# Patient Record
Sex: MALE | Employment: UNEMPLOYED | URBAN - METROPOLITAN AREA
[De-identification: names, ages, dates, MRNs, and addresses within clinical notes are randomized per-mention and may not be internally consistent; named-entity substitution may affect disease eponyms.]

---

## 2017-11-15 ENCOUNTER — OFFICE VISIT (OUTPATIENT)
Dept: BEHAVIORAL/MENTAL HEALTH CLINIC | Age: 62
End: 2017-11-15

## 2017-11-15 VITALS
SYSTOLIC BLOOD PRESSURE: 173 MMHG | DIASTOLIC BLOOD PRESSURE: 86 MMHG | OXYGEN SATURATION: 99 % | HEART RATE: 67 BPM | BODY MASS INDEX: 29.55 KG/M2 | HEIGHT: 73 IN | WEIGHT: 223 LBS

## 2017-11-15 DIAGNOSIS — F17.210 CIGARETTE SMOKER: ICD-10-CM

## 2017-11-15 DIAGNOSIS — F33.1 MODERATE EPISODE OF RECURRENT MAJOR DEPRESSIVE DISORDER (HCC): Primary | ICD-10-CM

## 2017-11-15 DIAGNOSIS — F41.1 GAD (GENERALIZED ANXIETY DISORDER): ICD-10-CM

## 2017-11-15 DIAGNOSIS — F43.10 PTSD (POST-TRAUMATIC STRESS DISORDER): ICD-10-CM

## 2017-11-15 PROBLEM — G89.4 CHRONIC PAIN DISORDER: Status: ACTIVE | Noted: 2017-11-15

## 2017-11-15 PROBLEM — H91.90 HOH (HARD OF HEARING): Status: ACTIVE | Noted: 2017-11-15

## 2017-11-15 PROBLEM — E11.9 DIABETES MELLITUS TYPE 2, CONTROLLED (HCC): Status: ACTIVE | Noted: 2017-11-15

## 2017-11-15 RX ORDER — GABAPENTIN 100 MG/1
100 CAPSULE ORAL 3 TIMES DAILY
Qty: 90 CAP | Refills: 1 | Status: SHIPPED | OUTPATIENT
Start: 2017-11-15 | End: 2018-01-22 | Stop reason: SDUPTHER

## 2017-11-15 RX ORDER — QUETIAPINE FUMARATE 100 MG/1
100 TABLET, FILM COATED ORAL
COMMUNITY
Start: 2017-10-31 | End: 2018-01-22 | Stop reason: SDUPTHER

## 2017-11-15 NOTE — MR AVS SNAPSHOT
Visit Information Date & Time Provider Department Dept. Phone Encounter #  
 11/15/2017  9:00 AM Morgan Shipley MD [facility] [phone] [id number] Follow-up Instructions Return in about 2 months (around 1/15/2018). Upcoming Health Maintenance Date Due Hepatitis C Screening 1955 Pneumococcal 19-64 Medium Risk (1 of 1 - PPSV23) 10/17/1975 FOBT Q 1 YEAR AGE 50-75 10/17/2006 ZOSTER VACCINE AGE 60> 8/17/2016 Influenza Age 5 to Adult 8/1/2017 DTaP/Tdap/Td series (2 - Td) 3/28/2023 Allergies as of 11/15/2017  Review Complete On: 11/15/2017 By: Mauro Polo CNA Severity Noted Reaction Type Reactions Flexeril [Cyclobenzaprine]  09/10/2015    Other (comments) Librium [Chlordiazepoxide Hcl]  09/10/2015    Hives Current Immunizations  Never Reviewed Name Date Tdap 3/28/2013  5:41 AM  
  
 Not reviewed this visit Vitals BP Pulse Height(growth percentile) Weight(growth percentile) SpO2 BMI  
 173/86 (BP 1 Location: Left arm, BP Patient Position: Sitting) 67 6' 1\" (1.854 m) 223 lb (101.2 kg) 99% 29.42 kg/m2 Smoking Status Current Every Day Smoker Vitals History BMI and BSA Data Body Mass Index Body Surface Area  
 29.42 kg/m 2 2.28 m 2 Preferred Pharmacy Pharmacy Name Phone [id number] [place], [id number] [id number] [phone] Your Updated Medication List  
  
   
This list is accurate as of: 11/15/17 10:21 AM.  Always use your most recent med list.  
  
  
  
  
 gabapentin 100 mg capsule Commonly known as:  NEURONTIN Take 1 Cap by mouth three (3) times daily. HYDROcodone-acetaminophen 5-500 mg Cap Take  by mouth. ibuprofen 600 mg tablet Commonly known as:  MOTRIN Take 1 Tab by mouth every six (6) hours as needed for Pain. oxyCODONE-acetaminophen 5-325 mg per tablet Commonly known as:  PERCOCET Take 1 Tab by mouth every four (4) hours as needed for Pain. PROzac 40 mg capsule Generic drug:  FLUoxetine Take 40 mg by mouth daily. QUEtiapine 100 mg tablet Commonly known as:  SEROquel Take 100 mg by mouth nightly. VALIUM 10 mg tablet Generic drug:  diazePAM  
Take 10 mg by mouth every six (6) hours as needed for Anxiety. XANAX PO Take  by mouth. Prescriptions Sent to Pharmacy Refills  
 gabapentin (NEURONTIN) 100 mg capsule 1 Sig: Take 1 Cap by mouth three (3) times daily. Class: Normal  
 Pharmacy: [address] [place] [address] 1  #: [phone] Route: Oral  
  
Follow-up Instructions Return in about 2 months (around 1/15/2018). Introducing [facility] SERVICES! Hanh Xavier introduces [employer] patient portal. Now you can access parts of your medical record, email your doctor's office, and request medication refills online. 1. In your internet browser, go to [email or web address] 2. Click on the First Time User? Click Here link in the Sign In box. You will see the New Member Sign Up page. 3. Enter your [employer] Access Code exactly as it appears below. You will not need to use this code after youve completed the sign-up process. If you do not sign up before the expiration date, you must request a new code. · [employer] Access Code: [email or web address] Expires: 2/13/2018 10:21 AM 
 
4. Enter the last four digits of your Social Security Number (xxxx) and Date of Birth (mm/dd/yyyy) as indicated and click Submit. You will be taken to the next sign-up page. 5. Create a [employer] ID. This will be your [employer] login ID and cannot be changed, so think of one that is secure and easy to remember. 6. Create a [employer] password. You can change your password at any time. 7. Enter your Password Reset Question and Answer.  This can be used at a later time if you forget your password. 8. Enter your e-mail address. You will receive e-mail notification when new information is available in [address] 9. Click Sign Up. You can now view and download portions of your medical record. 10. Click the Download Summary menu link to download a portable copy of your medical information. If you have questions, please visit the Frequently Asked Questions section of the [employer] website. Remember, [employer] is NOT to be used for urgent needs. For medical emergencies, dial 911. Now available from your iPhone and Android! Please provide this summary of care documentation to your next provider. Your primary care clinician is listed as Ursula Kwon. If you have any questions after today's visit, please call [phone].
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
## 2018-01-22 ENCOUNTER — OFFICE VISIT (OUTPATIENT)
Dept: BEHAVIORAL/MENTAL HEALTH CLINIC | Age: 63
End: 2018-01-22

## 2018-01-22 ENCOUNTER — TELEPHONE (OUTPATIENT)
Dept: BEHAVIORAL/MENTAL HEALTH CLINIC | Age: 63
End: 2018-01-22

## 2018-01-22 DIAGNOSIS — F43.10 PTSD (POST-TRAUMATIC STRESS DISORDER): ICD-10-CM

## 2018-01-22 DIAGNOSIS — F17.210 CIGARETTE SMOKER: ICD-10-CM

## 2018-01-22 DIAGNOSIS — F41.1 GAD (GENERALIZED ANXIETY DISORDER): ICD-10-CM

## 2018-01-22 DIAGNOSIS — F33.1 MODERATE EPISODE OF RECURRENT MAJOR DEPRESSIVE DISORDER (HCC): Primary | ICD-10-CM

## 2018-01-22 NOTE — PROGRESS NOTES
Psychiatric Outpatient Progress Note    Account Number:  [de-identified]  Name: Kitty Delacruz    SUBJECTIVE:   CHIEF COMPLAINT:  Kitty Delacruz is a 65 y.o. male and was seen today for her first follow-up of psychiatric condition and psychotropic medication management. He came 2 hours late today. He was late for his initial evaluation as well. HPI:    Valencia Torre reports the following psychiatric symptoms:  depression, anxiety and MCI. The symptoms have been present for years and are of moderate severity. The symptoms occur daily. Initial Assessement & Diagnoses (11/15/17): This is a 69-year-old,  white male, with a long history of PTSD, depression, anxiety and multiple medical problems was seen today to establish his mental health treatment here as his previous psychiatrist retired. Patient is currently depressed and significant anxiety and marginal level of functioning. Patient was initially focused on getting more benzodiazepines but agreed to try gabapentin for his anxiety and chronic pain and continue current dose of Prozac and Seroquel. Patient is getting counseling through [facility].   Primary diagnosis: Major depressive disorder, recurrent, moderate, PTSD, LAVERN, nicotine dependence  Secondary diagnosis: No significant personality issues at this time  Tertiary diagnosis: Severe hard of hearing, diabetes mellitus, status post back and neck injury, chronic pain, obesity  Strength & Weaknesses: Patient is cooperative and pleasant. Motivated to change. Has good primary care follow-up. Today, he reported that he is not doing well and stays extremely anxious. Starting of gabapentin on the initial visit has not been very helpful. He is [employer] and has poor self care. Appears much older than his stated age. He wanted to go back on Xanax though already showing cognitive impairment. He is irritable and argumentative. Reported poor sleep. Eating well. Weight has gone up.  Reports compliance with medications. Denies any psychosis or shira. Contributing factors include: unemployed, lives alone. Patient denies SI/HI/SIB. Side Effects:  none      Fam/Soc Hx (from [place] with updates):       REVIEW OF SYSTEMS:  Constitutional: positive for fatigue and weight gain  Eyes: positive for contacts/glasses and visual disturbance  Ears, nose, mouth, throat, and face: positive for hearing loss  Respiratory: positive for cough  Musculoskeletal:positive for myalgias and arthralgias  Neurological: positive for memory problems and gait problems  Behavioral/Psych: positive for anxiety and depression, negative for SI or HI     VITALS:   1/22/2018   BLOOD PRESSURE - SYSTOLIC 424   BLOOD PRESSURE - DIASTOLIC 75   WEIGHT 602 lb   BODY MASS INDEX 31.8   PULSE 73       OBJECTIVE:                 Mental Status exam: WNL except for      Sensorium  confused, oriented to time, place and person   Relations uncooperative, unreliable and vague    Eye Contact    poor   Appearance:  bearded, casually dressed, disheveled, older than stated age and poor hygiene   Motor Behavior/Gait:  hypoactive and gait unsteady   Speech:  normal pitch and normal volume   Thought Process: circumstantial, illogical and tangential   Thought Content free of delusions and free of hallucinations   Suicidal ideations none   Homicidal ideations none   Mood:  depressed   Affect:  anxious, irritable and labile   Memory recent  impaired   Memory remote:  adequate   Concentration:  adequate   Abstraction:  concrete   Insight:  fair   Reliability poor   Judgment:  poor       MEDICAL DECISION MAKING  Data: pertinent labs, imaging, medical records and diagnostic tests reviewed and incorporated in diagnosis and treatment plan    Allergies   Allergen Reactions    Flexeril [Cyclobenzaprine] Other (comments)    Librium [Chlordiazepoxide Hcl] Hives        Current Outpatient Prescriptions   Medication Sig Dispense Refill    albuterol (VENTOLIN HFA) 90 mcg/actuation inhaler TAKE 1-2 PUFFS EVERY 4 TO 6 HOURS AS NEEDED      diazePAM (VALIUM) 2 mg tablet Take 1 Tab by mouth every eight (8) hours as needed for Anxiety. Max Daily Amount: 6 mg. 90 Tab 1    gabapentin (NEURONTIN) 300 mg capsule Take 1 Cap by mouth three (3) times daily. 90 Cap 1    QUEtiapine (SEROQUEL) 100 mg tablet Take 1 Tab by mouth nightly. 30 Tab 1    FLUoxetine (PROZAC) 40 mg capsule Take 1 Cap by mouth daily. 30 Cap 1    ibuprofen (MOTRIN) 600 mg tablet Take 1 Tab by mouth every six (6) hours as needed for Pain. 20 Tab 0          Problems addressed today:  MDD, LAVERN, PTSD, Smoker    Assessment:   Edie Martin  is a 65 y.o.  male  is not responding to treatment. Symptoms are unstable. Patient denies SI/HI/SIB. No evidence of AH/VH or delusions. Risk Scoring- chronic illnesses and prescription drug management    Treatment Plan:  1. Medications:          Medication Changes/Adjustments: start low dose diazepam ( short term), increase gabapentin, continue Prozac, and Seroquel in the current dosages. Current Outpatient Prescriptions   Medication Sig Dispense Refill    albuterol (VENTOLIN HFA) 90 mcg/actuation inhaler TAKE 1-2 PUFFS EVERY 4 TO 6 HOURS AS NEEDED      diazePAM (VALIUM) 2 mg tablet Take 1 Tab by mouth every eight (8) hours as needed for Anxiety. Max Daily Amount: 6 mg. 90 Tab 1    gabapentin (NEURONTIN) 300 mg capsule Take 1 Cap by mouth three (3) times daily. 90 Cap 1    QUEtiapine (SEROQUEL) 100 mg tablet Take 1 Tab by mouth nightly. 30 Tab 1    FLUoxetine (PROZAC) 40 mg capsule Take 1 Cap by mouth daily. 30 Cap 1    ibuprofen (MOTRIN) 600 mg tablet Take 1 Tab by mouth every six (6) hours as needed for Pain.  20 Tab 0                  The following regarding medications was addressed:    (The risks and benefits of the proposed medication; the potential medication side effects ie    dry mouth, weight gain, GI upset, headache; patient given opportunity to ask questions)       2. Counseling and coordination of care including instructions for treatment, risks/benefits, risk factor reduction and patient/family education. He agrees with the plan. Patient instructed to call with any side effects, questions or issues. 3. Pt was educated and counseled on his demands for Xanax and it's complications. He was also educated on his tardiness. PSYCHOTHERAPY:  approx 20 minutes  Type:  Supportive/Solution Focused psychotherapy provided  Focus:     Current problems:              compliance   Housing issues   Occupational issues   Medical issues     Psychoeducation provided: psych medications    Treatment plan reviewed with patient-including diagnosis and medications    Worked on issues of denial & effects of benzo dependency/use    Bob Mcintosh is not progressing.     Follow up : 2 months      Won Duarte MD  1/22/2018

## 2018-02-23 ENCOUNTER — OFFICE VISIT (OUTPATIENT)
Dept: HEMATOLOGY | Age: 63
End: 2018-02-23

## 2018-02-23 VITALS
SYSTOLIC BLOOD PRESSURE: 154 MMHG | HEART RATE: 57 BPM | DIASTOLIC BLOOD PRESSURE: 67 MMHG | TEMPERATURE: 97.8 F | BODY MASS INDEX: 33.74 KG/M2 | OXYGEN SATURATION: 98 % | WEIGHT: 254.6 LBS | HEIGHT: 73 IN

## 2018-02-23 DIAGNOSIS — B18.2 CHRONIC HEPATITIS C WITHOUT HEPATIC COMA (HCC): Primary | ICD-10-CM

## 2018-02-23 NOTE — PROGRESS NOTES
Chief Complaint   Patient presents with   [facility] Patient     HCV     Visit Vitals    /67 (BP 1 Location: Left arm, BP Patient Position: Sitting)    Pulse (!) 57    Temp 97.8 °F (36.6 °C) (Tympanic)    Ht 6' 1\" (1.854 m)    Wt 254 lb 9.6 oz (115.5 kg)    SpO2 98%    BMI 33.59 kg/m2

## 2018-02-23 NOTE — PROGRESS NOTES
70 Lenin Shrestha MD, [address], [address], [place]       April Juan Delarosa, KELSIE Blanca, HENRIQUE Hodge, ACNP-BC   KELSIE Torres NP [facility]    at [facility]    [facility], [address] [place] [address], [facility] 22.    [phone]    FAX: [address] [place] [address], [address]    [phone]    FAX: [phone]       Patient Care Team:  Judah Stephens NP as PCP - General (Nurse Practitioner)  Edgar Moon MD (General Surgery)      Problem List  Date Reviewed: 11/15/2017          Codes Class Noted    Chronic hepatitis C without hepatic coma ([facility] 75.) ICD-10-CM: B18.2  ICD-9-CM: 070.54  2/23/2018        Moderate episode of recurrent major depressive disorder ([facility] 75.) ICD-10-CM: F33.1  ICD-9-CM: 296.32  11/15/2017        PTSD (post-traumatic stress disorder) ICD-10-CM: F43.10  ICD-9-CM: 309.81  11/15/2017        LAVERN (generalized anxiety disorder) ICD-10-CM: F41.1  ICD-9-CM: 300.02  11/15/2017        [place] (hard of hearing) ICD-10-CM: H91.90  ICD-9-CM: 389.9  11/15/2017        Diabetes mellitus type 2, controlled ([facility] 75.) ICD-10-CM: E11.9  ICD-9-CM: 250.00  11/15/2017        Cigarette smoker ICD-10-CM: F17.210  ICD-9-CM: 305.1  11/15/2017        Chronic pain disorder ICD-10-CM: G89.4  ICD-9-CM: 338.4  11/15/2017    Overview Signed 11/15/2017 10:25 AM by Aam Hu MD     Back and neck             Morbid obesity ([facility] 75.) ICD-10-CM: E66.01  ICD-9-CM: 278.01  9/29/2015        Incisional hernia ICD-10-CM: M96.5  ICD-9-CM: 553.21  9/29/2015              The physicians listed above have asked me to see Corazon Mcclellan in consultation regarding chronic HCV and its management.   All medical records sent by the referring physicians were reviewed. The patient is a 65 y.o.  male who was noted to have abnormalities in liver chemistries and subsequently tested positive for chronic HCV remotely. Risk factors for acquiring HCV are blood transfusions. There was no history of acute icteric hepatitis at the time of these risk factors. The most recent imaging of the liver was CT performed in 2015. Results suggest that the liver is normal.      An assessment of liver fibrosis with biopsy or elastography has not been performed. The patient has never received treatment for chronic HCV. The most recent laboratory studies indicate that the liver transaminases are normal, alkaline phosphatase is normal, elevated, tests of hepatic synthetic and metabolic function are normal, total bilirubin is normal and albumin is normal.     The patient has no symptoms which can be attributed to the liver disorder. The patient has not experienced pain in the right side over the liver, yellowing of the eyes or skin, swelling of the abdomen or swelling of the lower extremities. The patient completes all daily activities without any functional limitations. ALLERGIES  Allergies   Allergen Reactions    Flexeril [Cyclobenzaprine] Other (comments)    Librium [Chlordiazepoxide Hcl] Hives       MEDICATIONS  Current Outpatient Prescriptions   Medication Sig    albuterol (VENTOLIN HFA) 90 mcg/actuation inhaler TAKE 1-2 PUFFS EVERY 4 TO 6 HOURS AS NEEDED    diazePAM (VALIUM) 2 mg tablet Take 1 Tab by mouth every eight (8) hours as needed for Anxiety. Max Daily Amount: 6 mg.    gabapentin (NEURONTIN) 300 mg capsule Take 1 Cap by mouth three (3) times daily.  QUEtiapine (SEROQUEL) 100 mg tablet Take 1 Tab by mouth nightly.  FLUoxetine (PROZAC) 40 mg capsule Take 1 Cap by mouth daily.  ibuprofen (MOTRIN) 600 mg tablet Take 1 Tab by mouth every six (6) hours as needed for Pain.      No current facility-administered medications for this visit. SYSTEM REVIEW NOT RELATED TO LIVER DISEASE OR REVIEWED ABOVE:  Constitution systems: Negative for fever, chills, weight gain, weight loss. Eyes: Negative for visual changes. ENT: Negative for sore throat, painful swallowing. Respiratory: Negative for cough, hemoptysis, SOB. Cardiology: Negative for chest pain, palpitations. GI:  Negative for constipation or diarrhea. : Negative for urinary frequency, dysuria, hematuria, nocturia. Skin: Negative for rash. Hematology: Negative for easy bruising, blood clots. Musculo-skeletal: Negative for back pain, muscle pain, weakness. Neurologic: Negative for headaches, dizziness, vertigo, memory problems not related to HE. Psychology: Positive for anxiety, depression and PTSD. FAMILY HISTORY:  The father  of AMI. The mother  of cancer. There is no family history of liver disease. SOCIAL HISTORY:  The patient is single. The patient has no children. The patient currently smokes 1 pack of tobacco daily. The patient has 2 beers 2-3/week. The patient is currently receiving disability. PHYSICAL EXAMINATION:  Visit Vitals    /67 (BP 1 Location: Left arm, BP Patient Position: Sitting)    Pulse (!) 57    Temp 97.8 °F (36.6 °C) (Tympanic)    Ht 6' 1\" (1.854 m)    Wt 254 lb 9.6 oz (115.5 kg)    SpO2 98%    BMI 33.59 kg/m2     General: No acute distress. Obese  Eyes: Sclera anicteric. ENT: No oral lesions. Nodes: No adenopathy. Skin: No spider angiomata. No jaundice. No palmar erythema. Respiratory: Lungs clear to auscultation. Cardiovascular: Regular heart rate. 2/6 mid-systolic murmur. No JVD. Abdomen: Soft non-tender. Liver size normal to percussion/palpation. Spleen not palpable. No obvious ascites. Extremities: No edema. No muscle wasting. No gross arthritic changes. Neurologic: Alert and oriented. Cranial nerves grossly intact. No asterixis.     LABORATORY STUDIES:  From 2/13/18:   AST//ALT/ALP/T Bili/ALB: 32/28/101/0.5/4.4  BUN/CR: 10/0.87    L[facility] [place] of [address] Units 2/23/2018   WBC 3.4 - 10.8 x10E3/uL 4.3   ANC 1.4 - 7.0 x10E3/uL 2.2   HGB 13.0 - 17.7 g/dL 13.0    - 379 x10E3/uL 180   INR 0.8 - 1.2 1.0   AST 0 - 40 IU/L 32   ALT 0 - 44 IU/L 27   Alk Phos 39 - 117 IU/L 73   Bili, Total 0.0 - 1.2 mg/dL 0.4   Bili, Direct 0.00 - 0.40 mg/dL 0.15   Albumin 3.6 - 4.8 g/dL 4.3   BUN 8 - 27 mg/dL 14   Creat 0.76 - 1.27 mg/dL 1.04   Na 134 - 144 mmol/L 140   K 3.5 - 5.2 mmol/L 5.6 (H)   Cl 96 - 106 mmol/L 101   CO2 18 - 29 mmol/L 25   Glucose 65 - 99 mg/dL 75     SEROLOGIES:  Not available or performed. Testing was performed today. LIVER HISTOLOGY:  Not available or performed    ENDOSCOPIC PROCEDURES:  Not available or performed    RADIOLOGY:  10/2015. CT scan abdomen with IV contrast.  Normal appearing liver. No liver mass lesions. Normal spleen. No ascites. OTHER TESTING:  Not available or performed    ASSESSMENT AND PLAN:  Given normal liver enzymes the patient may have had spontaneous resolution of HCV. If HCV RNA is negative the patient will return for a second test to confirm spontaneous resolution and ensure the first HCV RNA test was not a false negative. Once there are 2 negative HCV RNA tests no further testing for HCV is required. If HCV RNA is positive additional evaluation for HCV will be needed prior to treatment. Liver function is normal.  Total bilirubin is normal.  Serum albumin is normal.  The platelet count is normal.      Based upon laboratory studies,  the patient does not appear to have advanced liver disease or cirrhosis. Will perform laboratory testing to monitor liver function and degree of liver injury. This included BMP, hepatic panel, CBC with platelet count and INR.       Will perform and/or review results of HCV viral load and HCV genotype to define the specific treatment and duration of treatment that will be required. Will perform serologic and virologic studies to assess for other causes of chronic liver disease. Will perform imaging of the liver with ultrasound. The need to perform an assessment of liver fibrosis was discussed with the patient. The FibroScan can assess liver fibrosis and determine if a patient has advanced fibrosis or cirrhosis without the need for liver biopsy. The FibroScan is currently available at [facility]. This will be performed at the next office visit. The patient has not previously been treated for HCV. Discussed the treatment alternatives. The SVR/cure rate for HCV now exceeds 90% with just oral anti-viral therapy and no interferon injections or significant side effects for most patients with HCV. The specific treatment is dependent upon genotype, viral load and histology. The patient was directed to continue all current medications at the current dosages. There are no contraindications for the patient to take any medications that are necessary for treatment of other medical issues. The patient was counseled regarding alcohol consumption. The need for vaccination against viral hepatitis A and B will be assessed with serologic and instituted as appropriate. [facility]. screening will be implemented if the patient is cirrhotic. All of the above issues were discussed with the patient. All questions were answered. The patient expressed a clear understanding of the above. [facility] 87 in 4 weeks for FibroScan, to review all data and determine the treatment plan.     Ministerio Khoury MD  L[facility] [place] of [address] [place] [address] Drive 6[address] [facility], [address] [address]  Adis Cunha  22.  [phone]

## 2018-02-23 NOTE — MR AVS SNAPSHOT
2700 [facility] [phone] [address] [place] [address] 
[phone] Patient: Juan C Boston MRN: [id number] :1955 Visit Information Date & Time Provider Department Dept. Phone Encounter #  
 2018 11:05 AM Oscar Arevalo [address] [place] [address] [id number] Your Appointments 3/21/2018  2:00 PM  
ESTABLISHED PATIENT with Elise Polk MD  
[facility]) Appt Note: 3 month follow-up [address] [facility] [address] Replaced by [facility] [address]  
  
   
 [facility] 316 [facility] [address] [facility] 7 [address] Upcoming Health Maintenance Date Due Hepatitis C Screening 1955 HEMOGLOBIN A1C Q6M 1955 LIPID PANEL Q1 1955 FOOT EXAM Q1 10/17/1966 MICROALBUMIN Q1 10/17/1966 EYE EXAM RETINAL OR DILATED Q1 10/17/1966 Pneumococcal 19-64 Medium Risk (1 of 1 - PPSV23) 10/17/1975 FOBT Q 1 YEAR AGE 50-75 10/17/2006 ZOSTER VACCINE AGE 60> 2016 Influenza Age 5 to Adult 2017 DTaP/Tdap/Td series (2 - Td) 3/28/2023 Allergies as of 2018  Review Complete On: 2018 By: Barrington Carter LPN Severity Noted Reaction Type Reactions Flexeril [Cyclobenzaprine]  09/10/2015    Other (comments) Librium [Chlordiazepoxide Hcl]  09/10/2015    Hives Current Immunizations  Never Reviewed Name Date Tdap 3/28/2013  5:41 AM  
  
 Not reviewed this visit You Were Diagnosed With   
  
 Codes Comments Chronic hepatitis C without hepatic coma (HCC)    -  Primary ICD-10-CM: B18.2 ICD-9-CM: 070.54 Vitals BP Pulse Temp Height(growth percentile) 154/67 (BP 1 Location: Left arm, BP Patient Position: Sitting) (!) 57 97.8 °F (36.6 °C) (Tympanic) 6' 1\" (1.854 m) Weight(growth percentile) SpO2 BMI Smoking Status 254 lb 9.6 oz (115.5 kg) 98% 33.59 kg/m2 Current Every Day Smoker BMI and BSA Data Body Mass Index Body Surface Area  
 33.59 kg/m 2 2.44 m 2 Preferred Pharmacy Pharmacy Name Phone [id number], [address] [facility] 3 [phone] Your Updated Medication List  
  
   
This list is accurate as of 2/23/18 11:48 AM.  Always use your most recent med list.  
  
  
  
  
 diazePAM 2 mg tablet Commonly known as:  VALIUM Take 1 Tab by mouth every eight (8) hours as needed for Anxiety. Max Daily Amount: 6 mg. FLUoxetine 40 mg capsule Commonly known as:  PROzac Take 1 Cap by mouth daily. gabapentin 300 mg capsule Commonly known as:  NEURONTIN Take 1 Cap by mouth three (3) times daily. ibuprofen 600 mg tablet Commonly known as:  MOTRIN Take 1 Tab by mouth every six (6) hours as needed for Pain. QUEtiapine 100 mg tablet Commonly known as:  SEROquel Take 1 Tab by mouth nightly. VENTOLIN HFA 90 mcg/actuation inhaler Generic drug:  albuterol TAKE 1-2 PUFFS EVERY 4 TO 6 HOURS AS NEEDED We Performed the Following CBC WITH AUTOMATED DIFF [94433 CPT(R)] HCV RNA BY ROSA QL,RFLX TO QT [31102 CPT(R)] HEP A AB, TOTAL [id number] CPT(R)] HEP B SURFACE AB [id number] CPT(R)] HEP B SURFACE AG [id number] CPT(R)] HEP C GENOTYPE [03052 CPT(R)] HEPATIC FUNCTION PANEL [05930 CPT(R)] HEPATITIS B CORE AB, TOTAL [id number] CPT(R)] METABOLIC PANEL, BASIC [61430 CPT(R)] PROTHROMBIN TIME + INR [91921 CPT(R)] To-Do List   
 04/02/2018 1:30 PM  
  Appointment with Isadora Kaplan. Med Sinha NP at [place] [address] ([phone]) Patient Instructions FIBROSCAN PATIENT INFORMATION What is Fibroscan:? 
 
Fibroscan is an ultrasound device that measures liver stiffness by sending a pulse of vibrations through the liver.   This translated into an immediate result that can help your healthcare team determine the level of damage to the liver as well as monitor the condition of various liver diseases over time. Fibroscan is helpful in the evaluation of the following conditions: 
 
Chronic Hepatitis C Chronic Hepatitis B Fatty Liver Disease Alcohol Liver Disease Chronic Cholestatic Liver Diseases What happens During the Scan? Patients receiving this exam lie flat on an examination table and raise the right arm above the head. The skin over the right lower rib cage is exposed and the examiner locates the correct area to be scanned. The prove of the scanner is placed directly on the patient and triggered to start. This fells like a gentle flick against the skin and should not be uncomfortable. At least ten (10) readings are taken and the average is calculated to score the amount of liver stiffness or scar tissue. The exam should take 10-20 minutes. What do I need to do to prepare for the scan? Please do not eat or drink anything 2-4 hours  Before your Fibroscan. You should continue taking any prescribed medication and can take small sips of water or clear fluid to do so,  But avoid drinking large amounts of fluid. Please dress comfortably in clothes that will allow for easy access to the right side of the abdomen. Women are discouraged from wearing a dress on the day of the exam. 
 
Are there any special precautions? Patients who are pregnant or have an implantable device (for example, pacemaker or defibrillator) should not have this exam performed. Patients with a significant amount of fat tissue in the area the probe is pressed may be unable to have test performed. Introducing \[facility] [place] [facility]\"" & HEALTH SERVICES! [employer] introduces [employer] patient portal. Now you can access parts of your medical record, email your doctor's office, and request medication refills online. 1. In your internet browser, go to [email or web address] 2. Click on the First Time User? Click Here link in the Sign In box. You will see the New Member Sign Up page. 3. Enter your [employer] Access Code exactly as it appears below. You will not need to use this code after youve completed the sign-up process. If you do not sign up before the expiration date, you must request a new code. · [employer] Access Code: [email or web address] Expires: 5/24/2018 11:23 AM 
 
4. Enter the last four digits of your Social Security Number (xxxx) and Date of Birth (mm/dd/yyyy) as indicated and click Submit. You will be taken to the next sign-up page. 5. Create a [employer] ID. This will be your [employer] login ID and cannot be changed, so think of one that is secure and easy to remember. 6. Create a [employer] password. You can change your password at any time. 7. Enter your Password Reset Question and Answer. This can be used at a later time if you forget your password. 8. Enter your e-mail address. You will receive e-mail notification when new information is available in [address] 9. Click Sign Up. You can now view and download portions of your medical record. 10. Click the Download Summary menu link to download a portable copy of your medical information. If you have questions, please visit the Frequently Asked Questions section of the [employer] website. Remember, [employer] is NOT to be used for urgent needs. For medical emergencies, dial 911. Now available from your iPhone and Android! Please provide this summary of care documentation to your next provider. Your primary care clinician is listed as vEie Hollis. If you have any questions after today's visit, please call [phone].

## 2018-02-24 LAB
ALBUMIN SERPL-MCNC: 4.3 G/DL (ref 3.6–4.8)
ALP SERPL-CCNC: 73 IU/L (ref 39–117)
ALT SERPL-CCNC: 27 IU/L (ref 0–44)
AST SERPL-CCNC: 32 IU/L (ref 0–40)
BASOPHILS # BLD AUTO: 0 X10E3/UL (ref 0–0.2)
BASOPHILS NFR BLD AUTO: 1 %
BILIRUB DIRECT SERPL-MCNC: 0.15 MG/DL (ref 0–0.4)
BILIRUB SERPL-MCNC: 0.4 MG/DL (ref 0–1.2)
BUN SERPL-MCNC: 14 MG/DL (ref 8–27)
BUN/CREAT SERPL: 13 (ref 10–24)
CALCIUM SERPL-MCNC: 9.6 MG/DL (ref 8.6–10.2)
CHLORIDE SERPL-SCNC: 101 MMOL/L (ref 96–106)
CO2 SERPL-SCNC: 25 MMOL/L (ref 18–29)
CREAT SERPL-MCNC: 1.04 MG/DL (ref 0.76–1.27)
EOSINOPHIL # BLD AUTO: 0.3 X10E3/UL (ref 0–0.4)
EOSINOPHIL NFR BLD AUTO: 6 %
ERYTHROCYTE [DISTWIDTH] IN BLOOD BY AUTOMATED COUNT: 14 % (ref 12.3–15.4)
GFR SERPLBLD CREATININE-BSD FMLA CKD-EPI: 77 ML/MIN/1.73
GFR SERPLBLD CREATININE-BSD FMLA CKD-EPI: 89 ML/MIN/1.73
GLUCOSE SERPL-MCNC: 75 MG/DL (ref 65–99)
HAV AB SER QL IA: POSITIVE
HBV CORE AB SERPL QL IA: POSITIVE
HBV SURFACE AB SER QL: REACTIVE
HBV SURFACE AG SERPL QL IA: NEGATIVE
HCT VFR BLD AUTO: 39.8 % (ref 37.5–51)
HGB BLD-MCNC: 13 G/DL (ref 13–17.7)
IMM GRANULOCYTES # BLD: 0 X10E3/UL (ref 0–0.1)
IMM GRANULOCYTES NFR BLD: 0 %
INR PPP: 1 (ref 0.8–1.2)
LYMPHOCYTES # BLD AUTO: 1.1 X10E3/UL (ref 0.7–3.1)
LYMPHOCYTES NFR BLD AUTO: 25 %
MCH RBC QN AUTO: 30 PG (ref 26.6–33)
MCHC RBC AUTO-ENTMCNC: 32.7 G/DL (ref 31.5–35.7)
MCV RBC AUTO: 92 FL (ref 79–97)
MONOCYTES # BLD AUTO: 0.7 X10E3/UL (ref 0.1–0.9)
MONOCYTES NFR BLD AUTO: 16 %
NEUTROPHILS # BLD AUTO: 2.2 X10E3/UL (ref 1.4–7)
NEUTROPHILS NFR BLD AUTO: 52 %
PLATELET # BLD AUTO: 180 X10E3/UL (ref 150–379)
POTASSIUM SERPL-SCNC: 5.6 MMOL/L (ref 3.5–5.2)
PROT SERPL-MCNC: 7.3 G/DL (ref 6–8.5)
PROTHROMBIN TIME: 10.6 SEC (ref 9.1–12)
RBC # BLD AUTO: 4.34 X10E6/UL (ref 4.14–5.8)
SODIUM SERPL-SCNC: 140 MMOL/L (ref 134–144)
WBC # BLD AUTO: 4.3 X10E3/UL (ref 3.4–10.8)

## 2018-02-26 LAB
HCV RNA SERPL NAA+PROBE-ACNC: NORMAL IU/ML
HCV RNA SERPL NAA+PROBE-ACNC: NORMAL IU/ML
HCV RNA SERPL NAA+PROBE-LOG IU: 7.46 {LOG_IU}/ML
HCV RNA SERPL QL NAA+PROBE: POSITIVE
TEST INFORMATION: NORMAL

## 2018-02-27 LAB
HCV GENTYP SERPL NAA+PROBE: NORMAL
PLEASE NOTE, 550474: NORMAL

## 2018-03-21 ENCOUNTER — OFFICE VISIT (OUTPATIENT)
Dept: BEHAVIORAL/MENTAL HEALTH CLINIC | Age: 63
End: 2018-03-21

## 2018-03-21 VITALS
HEART RATE: 75 BPM | BODY MASS INDEX: 33.13 KG/M2 | WEIGHT: 250 LBS | DIASTOLIC BLOOD PRESSURE: 85 MMHG | SYSTOLIC BLOOD PRESSURE: 178 MMHG | HEIGHT: 73 IN

## 2018-03-21 DIAGNOSIS — F43.10 PTSD (POST-TRAUMATIC STRESS DISORDER): ICD-10-CM

## 2018-03-21 DIAGNOSIS — F33.1 MODERATE EPISODE OF RECURRENT MAJOR DEPRESSIVE DISORDER (HCC): Primary | ICD-10-CM

## 2018-03-21 DIAGNOSIS — F17.210 CIGARETTE SMOKER: ICD-10-CM

## 2018-03-21 DIAGNOSIS — F41.1 GAD (GENERALIZED ANXIETY DISORDER): ICD-10-CM

## 2018-03-21 RX ORDER — TIOTROPIUM BROMIDE INHALATION SPRAY 3.12 UG/1
SPRAY, METERED RESPIRATORY (INHALATION)
Refills: 11 | COMMUNITY
Start: 2018-03-13

## 2018-03-21 RX ORDER — DIAZEPAM 2 MG/1
2 TABLET ORAL
Qty: 90 TAB | Refills: 2 | Status: SHIPPED | OUTPATIENT
Start: 2018-03-21 | End: 2018-06-13 | Stop reason: SDUPTHER

## 2018-03-21 RX ORDER — FLUOXETINE HYDROCHLORIDE 40 MG/1
40 CAPSULE ORAL DAILY
Qty: 30 CAP | Refills: 2 | Status: SHIPPED | OUTPATIENT
Start: 2018-03-21 | End: 2018-06-13 | Stop reason: SDUPTHER

## 2018-03-21 RX ORDER — KETOROLAC TROMETHAMINE 10 MG/1
TABLET, FILM COATED ORAL
Refills: 3 | COMMUNITY
Start: 2018-03-13 | End: 2018-10-01

## 2018-03-21 RX ORDER — MISOPROSTOL 200 UG/1
TABLET ORAL
Refills: 5 | COMMUNITY
Start: 2018-03-13 | End: 2019-01-02

## 2018-03-21 RX ORDER — QUETIAPINE FUMARATE 100 MG/1
100 TABLET, FILM COATED ORAL
Qty: 30 TAB | Refills: 2 | Status: SHIPPED | OUTPATIENT
Start: 2018-03-21 | End: 2018-06-13 | Stop reason: SDUPTHER

## 2018-03-21 RX ORDER — GABAPENTIN 300 MG/1
300 CAPSULE ORAL 3 TIMES DAILY
Qty: 90 CAP | Refills: 2 | Status: SHIPPED | OUTPATIENT
Start: 2018-03-21 | End: 2018-06-13 | Stop reason: SDUPTHER

## 2018-03-21 NOTE — PROGRESS NOTES
Psychiatric Outpatient Progress Note    Account Number:  [de-identified]  Name: Corazon Mcclellan    SUBJECTIVE:   CHIEF COMPLAINT:  Corazon Mcclellan is a 65 y.o. , White male and was seen today for her first follow-up of psychiatric condition and psychotropic medication management.      HPI:    Derral Scale reports the following psychiatric symptoms:  depression, anxiety and MCI. The symptoms have been present for years and are of moderate severity. The symptoms occur daily. .     Patient  reported that he is  doing better with starting of diazepam in the last visit. He still has anxiety and was requesting higher dose of diazepam. He reported compliance with all his medications. He is [place] and has poor self care. Appears much older than his stated age. He was less irritable and more agreeable. Eating well. Weight has gone up. Reports compliance with medications. Denies any psychosis or shira. He has gained 9 lbs. BMI = 33. Not very active physically due to his painful back and knees. He is mildly SOB at baseline. Continues to smoke 1 ppd despite severe lung disease. BP is high. Reported compliance with his non psych medications.       Contributing factors include: unemployed, lives alone.     Patient denies SI/HI/SIB.      Side Effects:  none       Fam/Soc Hx (from [place] with updates):       REVIEW OF SYSTEMS:  Constitutional: positive for fatigue and weight gain  Eyes: positive for contacts/glasses and visual disturbance  Ears, nose, mouth, throat, and face: positive for hearing loss  Respiratory: positive for SOB  Musculoskeletal:positive for myalgias and arthralgias  Neurological: positive for memory problems and gait problems  Behavioral/Psych: positive for anxiety and depression, negative for SI or HI    Visit Vitals    /85    Pulse 75    Ht 6' 1\" (1.854 m)    Wt 113.4 kg (250 lb)    BMI 32.98 kg/m2       OBJECTIVE:                 Mental Status exam: WNL except for      Sensorium  oriented to time, place and person   Relations cooperative and passive    Eye Contact    appropriate   Appearance:  age appropriate, bearded, casually dressed and poor hygiene   Motor Behavior/Gait:  hypoactive   Speech:  hyperverbal, normal pitch and normal volume   Thought Process: circumstantial and tangential   Thought Content free of delusions and free of hallucinations   Suicidal ideations none   Homicidal ideations none   Mood:  euthymic   Affect:  anxious   Memory recent  adequate   Memory remote:  adequate   Concentration:  adequate   Abstraction:  concrete   Insight:  fair   Reliability fair   Judgment:  limited       MEDICAL DECISION MAKING  Data: pertinent labs, imaging, medical records and diagnostic tests reviewed and incorporated in diagnosis and treatment plan    Allergies   Allergen Reactions    Flexeril [Cyclobenzaprine] Other (comments)    Librium [Chlordiazepoxide Hcl] Hives        Current Outpatient Prescriptions   Medication Sig Dispense Refill    SPIRIVA RESPIMAT 2.5 mcg/actuation inhaler INHALE 2 PUFFS BY MOUTH EVERY MORNING  11    miSOPROStol (CYTOTEC) 200 mcg tablet TAKE 1 TABLET BY MOUTH WITH NAPROXEN  5    ketorolac (TORADOL) 10 mg tablet TAKE 1 TABLET BY MOUTH TWICE A DAY AS NEEDED  3    diazePAM (VALIUM) 2 mg tablet Take 1 Tab by mouth every eight (8) hours as needed for Anxiety. Max Daily Amount: 6 mg. 90 Tab 2    gabapentin (NEURONTIN) 300 mg capsule Take 1 Cap by mouth three (3) times daily. 90 Cap 2    QUEtiapine (SEROQUEL) 100 mg tablet Take 1 Tab by mouth nightly. 30 Tab 2    FLUoxetine (PROZAC) 40 mg capsule Take 1 Cap by mouth daily. 30 Cap 2    albuterol (VENTOLIN HFA) 90 mcg/actuation inhaler TAKE 1-2 PUFFS EVERY 4 TO 6 HOURS AS NEEDED      ibuprofen (MOTRIN) 600 mg tablet Take 1 Tab by mouth every six (6) hours as needed for Pain. 20 Tab 0          Problems addressed today:    ICD-10-CM ICD-9-CM    1. Moderate episode of recurrent major depressive disorder (HCC) F33.1 296.32    2. PTSD (post-traumatic stress disorder) F43.10 309.81    3. LAVERN (generalized anxiety disorder) F41.1 300.02 diazePAM (VALIUM) 2 mg tablet   4. Cigarette smoker F17.210 305.1        Assessment:   Augustine Anderson  is a 65 y.o.  male  Is partially responding to treatment. Symptoms are improving. Patient denies SI/HI/SIB. No evidence of AH/VH or delusions. Risk Scoring- chronic illnesses and prescription drug management    Treatment Plan:  1. Medications:          Medication Changes/Adjustments: 1. Continue current combination of Prozac, gabapentin, Seroquel and diazepam.                                                                 2.  Patient's request of increasing the dose of diazepam was denied based on his current MCI and                                                                        Potential of medication dependence. .    Current Outpatient Prescriptions   Medication Sig Dispense Refill    SPIRIVA RESPIMAT 2.5 mcg/actuation inhaler INHALE 2 PUFFS BY MOUTH EVERY MORNING  11    miSOPROStol (CYTOTEC) 200 mcg tablet TAKE 1 TABLET BY MOUTH WITH NAPROXEN  5    ketorolac (TORADOL) 10 mg tablet TAKE 1 TABLET BY MOUTH TWICE A DAY AS NEEDED  3    diazePAM (VALIUM) 2 mg tablet Take 1 Tab by mouth every eight (8) hours as needed for Anxiety. Max Daily Amount: 6 mg. 90 Tab 2    gabapentin (NEURONTIN) 300 mg capsule Take 1 Cap by mouth three (3) times daily. 90 Cap 2    QUEtiapine (SEROQUEL) 100 mg tablet Take 1 Tab by mouth nightly. 30 Tab 2    FLUoxetine (PROZAC) 40 mg capsule Take 1 Cap by mouth daily. 30 Cap 2    albuterol (VENTOLIN HFA) 90 mcg/actuation inhaler TAKE 1-2 PUFFS EVERY 4 TO 6 HOURS AS NEEDED      ibuprofen (MOTRIN) 600 mg tablet Take 1 Tab by mouth every six (6) hours as needed for Pain.  20 Tab 0                  The following regarding medications was addressed:    (The risks and benefits of the proposed medication; the potential medication side effects ie    dry mouth, weight gain, GI upset, headache; patient given opportunity to ask questions)       2. Counseling and coordination of care including instructions for treatment, risks/benefits, risk factor reduction and patient/family education. He agrees with the plan. Patient instructed to call with any side effects, questions or issues. 3.  Patient was provided supportive counseling on his distress of dealing with chronic medical illnesses, chronic pain and significant anxiety. Patient was educated on smoking cessation in the light of his worsening lung conditions. Patient does not appear to be open to the idea      of smoking cessation at this time. Patient was also educated on addictive potential of benzodiazepine. PSYCHOTHERAPY:  approx 20 minutes  Type:  Supportive/Solution Focused psychotherapy provided  Focus:     Current problems:              Obesity              Chronic pain   Medical issues    Psychoeducation provided: psych medications. Treatment plan reviewed with patient-including diagnosis and medications    Worked on issues of denial & effects of nicotine dependency/use    Clari Dominguez is slowly progressing. Follow-up Disposition:  Return in about 3 months (around 6/21/2018).       Timothy Samaniego MD  3/21/2018

## 2018-03-21 NOTE — MR AVS SNAPSHOT
[address] [facility] 
 
 
 [address] [facility] [address] [place] [address] 
[phone] Patient: Elena Wolf MRN: [id number] :1955 Visit Information Date & Time Provider Department Dept. Phone Encounter #  
 3/21/2018  2:00 PM Dennys Beatty MD [facility] [phone] [id number] Follow-up Instructions Return in about 3 months (around 2018). Your Appointments 2018  2:00 PM  
ESTABLISHED PATIENT with Dennys Beatty MD  
[facility] Appt Note: 3 month follow-up 83[address] [facility] [address] [facility] Valery Medina 178  
  
   
 [address] 101 [facility] [address] Upcoming Health Maintenance Date Due HEMOGLOBIN A1C Q6M 1955 LIPID PANEL Q1 1955 FOOT EXAM Q1 10/17/1966 MICROALBUMIN Q1 10/17/1966 EYE EXAM RETINAL OR DILATED Q1 10/17/1966 Pneumococcal 19-64 Medium Risk (1 of 1 - PPSV23) 10/17/1975 FOBT Q 1 YEAR AGE 50-75 10/17/2006 ZOSTER VACCINE AGE 60> 2016 Influenza Age 5 to Adult 2017 DTaP/Tdap/Td series (2 - Td) 3/28/2023 Allergies as of 3/21/2018  Review Complete On: 3/21/2018 By: Dennys Beatty MD  
  
 Severity Noted Reaction Type Reactions Flexeril [Cyclobenzaprine]  09/10/2015    Other (comments) Librium [Chlordiazepoxide Hcl]  09/10/2015    Hives Current Immunizations  Never Reviewed Name Date Tdap 3/28/2013  5:41 AM  
  
 Not reviewed this visit You Were Diagnosed With   
  
 Codes Comments Moderate episode of recurrent major depressive disorder ([facility] [address]    -  Primary ICD-10-CM: F33.1 ICD-9-CM: 296.32   
 PTSD (post-traumatic stress disorder)     ICD-10-CM: F43.10 ICD-9-CM: 309.81 LAVERN (generalized anxiety disorder)     ICD-10-CM: F41.1 ICD-9-CM: 300.02 Cigarette smoker     ICD-10-CM: F17.210 ICD-9-CM: 305.1 Vitals BP Pulse Height(growth percentile) Weight(growth percentile) BMI Smoking Status 178/85 75 6' 1\" (1.854 m) 250 lb (113.4 kg) 32.98 kg/m2 Current Every Day Smoker Vitals History BMI and BSA Data Body Mass Index Body Surface Area 32.98 kg/m 2 2.42 m 2 Preferred Pharmacy Pharmacy Name Phone [phone], [address] [facility] [phone] Your Updated Medication List  
  
   
This list is accurate as of 3/21/18  2:00 PM.  Always use your most recent med list.  
  
  
  
  
 diazePAM 2 mg tablet Commonly known as:  VALIUM Take 1 Tab by mouth every eight (8) hours as needed for Anxiety. Max Daily Amount: 6 mg. FLUoxetine 40 mg capsule Commonly known as:  PROzac Take 1 Cap by mouth daily. gabapentin 300 mg capsule Commonly known as:  NEURONTIN Take 1 Cap by mouth three (3) times daily. ibuprofen 600 mg tablet Commonly known as:  MOTRIN Take 1 Tab by mouth every six (6) hours as needed for Pain.  
  
 ketorolac 10 mg tablet Commonly known as:  TORADOL TAKE 1 TABLET BY MOUTH TWICE A DAY AS NEEDED  
  
 miSOPROStol 200 mcg tablet Commonly known as:  CYTOTEC  
TAKE 1 TABLET BY MOUTH WITH NAPROXEN QUEtiapine 100 mg tablet Commonly known as:  SEROquel Take 1 Tab by mouth nightly. SPIRIVA RESPIMAT 2.5 mcg/actuation inhaler Generic drug:  tiotropium bromide INHALE 2 PUFFS BY MOUTH EVERY MORNING  
  
 VENTOLIN HFA 90 mcg/actuation inhaler Generic drug:  albuterol TAKE 1-2 PUFFS EVERY 4 TO 6 HOURS AS NEEDED Prescriptions Printed Refills  
 diazePAM (VALIUM) 2 mg tablet 2 Sig: Take 1 Tab by mouth every eight (8) hours as needed for Anxiety. Max Daily Amount: 6 mg. Class: Print Route: Oral  
  
Prescriptions Sent to Pharmacy Refills  
 gabapentin (NEURONTIN) 300 mg capsule 2 Sig: Take 1 Cap by mouth three (3) times daily.   
 Class: Normal  
 Pharmacy: [address] [place] [address] Ph #: [phone] Route: Oral  
 QUEtiapine (SEROQUEL) 100 mg tablet 2 Sig: Take 1 Tab by mouth nightly. Class: Normal  
 Pharmacy: [address] [place] [address] Ph #: [phone] Route: Oral  
 FLUoxetine (PROZAC) 40 mg capsule 2 Sig: Take 1 Cap by mouth daily. Class: Normal  
 Pharmacy: [address] [place] [address] Ph #: [phone] Route: Oral  
  
Follow-up Instructions Return in about 3 months (around 6/21/2018). To-Do List   
 04/02/2018 1:30 PM  
  Appointment with Jennie aCde. Sarah Rose NP at [place] [address] ([phone]) Introducing [facility] SERVICES! [facility] introduces [employer] patient portal. Now you can access parts of your medical record, email your doctor's office, and request medication refills online. 1. In your internet browser, go to [email or web address] 2. Click on the First Time User? Click Here link in the Sign In box. You will see the New Member Sign Up page. 3. Enter your [employer] Access Code exactly as it appears below. You will not need to use this code after youve completed the sign-up process. If you do not sign up before the expiration date, you must request a new code. · [employer] Access Code: [email or web address] Expires: 5/24/2018 12:23 PM 
 
4. Enter the last four digits of your Social Security Number (xxxx) and Date of Birth (mm/dd/yyyy) as indicated and click Submit. You will be taken to the next sign-up page. 5. Create a ShowMe VI[employer] ID. This will be your [employer] login ID and cannot be changed, so think of one that is secure and easy to remember. 6. Create a [employer] password. You can change your password at any time. 7. Enter your Password Reset Question and Answer.  This can be used at a later time if you forget your password. 8. Enter your e-mail address. You will receive e-mail notification when new information is available in [address] 9. Click Sign Up. You can now view and download portions of your medical record. 10. Click the Download Summary menu link to download a portable copy of your medical information. If you have questions, please visit the Frequently Asked Questions section of the [employer] website. Remember, [employer] is NOT to be used for urgent needs. For medical emergencies, dial 911. Now available from your iPhone and Android! Please provide this summary of care documentation to your next provider. Your primary care clinician is listed as Victoriano Ramírez. If you have any questions after today's visit, please call [phone].

## 2018-04-13 ENCOUNTER — OFFICE VISIT (OUTPATIENT)
Dept: HEMATOLOGY | Age: 63
End: 2018-04-13

## 2018-04-13 VITALS
TEMPERATURE: 97 F | WEIGHT: 254 LBS | HEART RATE: 71 BPM | OXYGEN SATURATION: 98 % | SYSTOLIC BLOOD PRESSURE: 188 MMHG | BODY MASS INDEX: 33.51 KG/M2 | DIASTOLIC BLOOD PRESSURE: 84 MMHG

## 2018-04-13 DIAGNOSIS — B18.2 CHRONIC HEPATITIS C WITHOUT HEPATIC COMA (HCC): Primary | ICD-10-CM

## 2018-04-13 RX ORDER — OXYCODONE AND ACETAMINOPHEN 5; 325 MG/1; MG/1
TABLET ORAL
Refills: 0 | COMMUNITY
Start: 2018-03-30 | End: 2018-10-01

## 2018-04-13 NOTE — PROGRESS NOTES
70 Lenin Shrestha MD, [address], [facility], [place]       Gabriele Brewster, HENRIQUE Roberto, [facility]-BC   Tereza Don, KELSIE [facility] 136    at [address], 81 [facility], [facility] 22.    [phone]    FAX: [address] [place] [address], [address]    [phone]    FAX: [phone]       Patient Care Team:  Kimberlyn Martinez NP as PCP - General (Nurse Practitioner)  Destiney Quinones MD (General Surgery)      Problem List  Date Reviewed: 2/25/2018          Codes Class Noted    Chronic hepatitis C without hepatic coma ([facility] ICD-10-CM: B18.2  ICD-9-CM: 070.54  2/23/2018        Moderate episode of recurrent major depressive disorder ([facility].) ICD-10-CM: F33.1  ICD-9-CM: 296.32  11/15/2017        PTSD (post-traumatic stress disorder) ICD-10-CM: F43.10  ICD-9-CM: 309.81  11/15/2017        LAVERN (generalized anxiety disorder) ICD-10-CM: F41.1  ICD-9-CM: 300.02  11/15/2017        [place] (hard of hearing) ICD-10-CM: H91.90  ICD-9-CM: 389.9  11/15/2017        Diabetes mellitus type 2, controlled ([facility] ICD-10-CM: E11.9  ICD-9-CM: 250.00  11/15/2017        Cigarette smoker ICD-10-CM: F17.210  ICD-9-CM: 305.1  11/15/2017        Chronic pain disorder ICD-10-CM: G89.4  ICD-9-CM: 338.4  11/15/2017    Overview Signed 11/15/2017 10:25 AM by Elen Woods MD     Back and neck             Morbid obesity ([facility] ICD-10-CM: E66.01  ICD-9-CM: 278.01  9/29/2015        Incisional hernia ICD-10-CM: K43.2  ICD-9-CM: 553.21  9/29/2015            Nika Shaw returns to the [address] for management of chronic HCV.  The active problem list, all pertinent past medical history, medications, liver histology, radiologic findings and laboratory findings related to the liver disorder were reviewed with the patient. The patient is a 65 y.o.  male who was noted to have abnormalities in liver chemistries and subsequently tested positive for chronic HCV remotely. Risk factors for acquiring HCV are blood transfusions. There was no history of acute icteric hepatitis at the time of these risk factors. The most recent imaging of the liver was CT performed in 2015. Results suggest that the liver is normal.      An assessment of liver fibrosis with elastography will be performed this visit. The patient has never received treatment for chronic HCV. The most recent laboratory studies indicate the liver transaminases are normal, alkaline phosphatase is normal, elevated, tests of hepatic synthetic and metabolic function are normal, total bilirubin is normal and albumin is normal.     The patient has no symptoms which can be attributed to the liver disorder. The patient has not experienced pain in the right side over the liver, yellowing of the eyes or skin, swelling of the abdomen or swelling of the lower extremities. The patient completes all daily activities without any functional limitations. ALLERGIES  Allergies   Allergen Reactions    Flexeril [Cyclobenzaprine] Other (comments)    Librium [Chlordiazepoxide Hcl] Hives     MEDICATIONS  Current Outpatient Prescriptions   Medication Sig    SPIRIVA RESPIMAT 2.5 mcg/actuation inhaler INHALE 2 PUFFS BY MOUTH EVERY MORNING    miSOPROStol (CYTOTEC) 200 mcg tablet TAKE 1 TABLET BY MOUTH WITH NAPROXEN    ketorolac (TORADOL) 10 mg tablet TAKE 1 TABLET BY MOUTH TWICE A DAY AS NEEDED    diazePAM (VALIUM) 2 mg tablet Take 1 Tab by mouth every eight (8) hours as needed for Anxiety. Max Daily Amount: 6 mg.    gabapentin (NEURONTIN) 300 mg capsule Take 1 Cap by mouth three (3) times daily.     QUEtiapine (SEROQUEL) 100 mg tablet Take 1 Tab by mouth nightly.  FLUoxetine (PROZAC) 40 mg capsule Take 1 Cap by mouth daily.  albuterol (VENTOLIN HFA) 90 mcg/actuation inhaler TAKE 1-2 PUFFS EVERY 4 TO 6 HOURS AS NEEDED    ibuprofen (MOTRIN) 600 mg tablet Take 1 Tab by mouth every six (6) hours as needed for Pain.  oxyCODONE-acetaminophen (PERCOCET) 5-325 mg per tablet TAKE 1-2 TABLETS BY MOUTH EVERY 4-6 HOURS AS NEEDED FOR PAIN     No current facility-administered medications for this visit. SYSTEM REVIEW NOT RELATED TO LIVER DISEASE OR REVIEWED ABOVE:  Constitution systems: Negative for fever, chills, weight gain, weight loss. Eyes: Negative for visual changes. ENT: Negative for sore throat, painful swallowing. Respiratory: Negative for cough, hemoptysis, SOB. Cardiology: Negative for chest pain, palpitations. GI:  Negative for constipation or diarrhea. : Negative for urinary frequency, dysuria, hematuria, nocturia. Skin: Negative for rash. Hematology: Negative for easy bruising, blood clots. Musculo-skeletal: Negative for back pain, muscle pain, weakness. Neurologic: Negative for headaches, dizziness, vertigo, memory problems not related to HE. Psychology: Positive for anxiety, depression and PTSD. FAMILY HISTORY:  The father  of AMI. The mother  of cancer. There is no family history of liver disease. SOCIAL HISTORY:  The patient is single. The patient has no children. The patient currently smokes 1 pack of tobacco daily. The patient has 2 beers 2-3/week. The patient is currently receiving disability. PHYSICAL EXAMINATION:  Visit Vitals    /84 (BP 1 Location: Right arm, BP Patient Position: Sitting)    Pulse 71    Temp 97 °F (36.1 °C) (Tympanic)    Wt 254 lb (115.2 kg)    SpO2 98%    BMI 33.51 kg/m2     General: No acute distress. Obese  Eyes: Sclera anicteric. ENT: No oral lesions. Nodes: No adenopathy. Skin: No spider angiomata. No jaundice.   No palmar erythema. Respiratory: Lungs clear to auscultation. Cardiovascular: Regular heart rate. 2/6 mid-systolic murmur. No JVD. Abdomen: Soft non-tender. Liver size normal to percussion/palpation. Spleen not palpable. No obvious ascites. Extremities: No edema. No muscle wasting. No gross arthritic changes. Neurologic: Alert and oriented. Cranial nerves grossly intact. No asterixis. LABORATORY STUDIES:  From 2/13/18:   AST//ALT/ALP/T Bili/ALB: 32/28/101/0.5/4.4  BUN/CR: 10/0.87    Liver [place] of [address] Units 2/23/2018   WBC 3.4 - 10.8 x10E3/uL 4.3   ANC 1.4 - 7.0 x10E3/uL 2.2   HGB 13.0 - 17.7 g/dL 13.0    - 379 x10E3/uL 180   INR 0.8 - 1.2 1.0   AST 0 - 40 IU/L 32   ALT 0 - 44 IU/L 27   Alk Phos 39 - 117 IU/L 73   Bili, Total 0.0 - 1.2 mg/dL 0.4   Bili, Direct 0.00 - 0.40 mg/dL 0.15   Albumin 3.6 - 4.8 g/dL 4.3   BUN 8 - 27 mg/dL 14   Creat 0.76 - 1.27 mg/dL 1.04   Na 134 - 144 mmol/L 140   K 3.5 - 5.2 mmol/L 5.6 (H)   Cl 96 - 106 mmol/L 101   CO2 18 - 29 mmol/L 25   Glucose 65 - 99 mg/dL 75     SEROLOGIES:  Serologies Latest Ref Rng & Units 2/23/2018   Hep A Ab, Total Negative Positive (A)   Hep B Surface Ag Negative Negative   Hep B Core Ab, Total Negative Positive (A)   Hep B Surface AB QL  Reactive   Hep C Genotype  1a   HCV RT-PCR, Quant IU/mL See Final Results     LIVER HISTOLOGY:  4/2018. [facility]. EkPa was 9.5. IQR/med 14%. The results suggested a fibrosis level of F2-F3. CAP was 328, consistent with fatty liver disease. ENDOSCOPIC PROCEDURES:  Not available or performed    RADIOLOGY:  10/2015. CT scan abdomen with IV contrast.  Normal appearing liver. No liver mass lesions. Normal spleen. No ascites.     OTHER TESTING:  Not available or performed    ASSESSMENT AND PLAN:  Liver function is normal.  Total bilirubin is normal.  Serum albumin is normal.  The platelet count is normal.      Based upon laboratory studies and elastography, the patient does not appear to have advanced liver disease or cirrhosis. The patient has not previously been treated for HCV. He is on Medicaid and Lieutenant Dougherty is preferred. I will order 8 weeks of therapy. The patient was directed to continue all current medications at the current dosages. There are no contraindications for the patient to take any medications that are necessary for treatment of other medical issues. The patient was counseled regarding alcohol consumption. Vaccination for viral hepatitis A and B is not needed. The patient has serologic evidence of prior exposure or vaccination with immunity. [facility] [address] screening is not necessary. The patient was counseled regarding diet and exercise to achieve weight loss. The best diet for patients with fatty liver is one very low in carbohydrates and enriched with protein such as an Debbie's program.  This was reviewed and a handout was provided. All of the above issues were discussed with the patient. All questions were answered. The patient expressed a clear understanding of the above. [facility] 87 4 weeks after initiation of medication. He does not have a phone and has asked we relay any messages about medication, etc to be directed to his counselor Garrick Paniagua at [phone]. There is another number listed near his name which I would try if he is not reached at the first number ([phone]).     CAMERON Ding-BC  Liver [place] [facility] [facility], [address] Adis Moreira  22.  [phone]

## 2018-04-13 NOTE — PROGRESS NOTES
1. Have you been to the ER, urgent care clinic since your last visit? Hospitalized since your last visit? No    2. Have you seen or consulted any other health care providers outside of the [address] [place] [address] since your last visit? Include any pap smears or colon screening. No   Chief Complaint   Patient presents with    Follow-up     Fibroscan     Visit Vitals    /84 (BP 1 Location: Right arm, BP Patient Position: Sitting)    Pulse 71    Temp 97 °F (36.1 °C) (Tympanic)    Wt 254 lb (115.2 kg)    SpO2 98%    BMI 33.51 kg/m2     PHQ over the last two weeks 4/13/2018   Little interest or pleasure in doing things Not at all   Feeling down, depressed or hopeless Not at all   Total Score PHQ 2 0     Fall Risk Assessment, last 12 mths 4/13/2018   Able to walk? Yes   Fall in past 12 months? No       Learning Assessment 4/13/2018   PRIMARY LEARNER Patient   BARRIERS PRIMARY LEARNER NONE   CO-LEARNER CAREGIVER No   PRIMARY LANGUAGE ENGLISH   LEARNER PREFERENCE PRIMARY LISTENING   ANSWERED BY patient    RELATIONSHIP SELF     Abuse Screening Questionnaire 4/13/2018   Do you ever feel afraid of your partner? N   Are you in a relationship with someone who physically or mentally threatens you? N   Is it safe for you to go home?  [place]

## 2018-04-13 NOTE — MR AVS SNAPSHOT
27[address] [facility] [phone] [address] [place] [address] 
[phone] Patient: Florentino Oconnor MRN: [id number] :1955 Visit Information Date & Time Provider Department Dept. Phone Encounter #  
 2018 10:15 AM Erinn Hawkins, [address] [facility] [address] [facility] [id number] Your Appointments 2018  2:00 PM  
ESTABLISHED PATIENT with Annette Burton MD  
[facility] Appt Note: 3 month follow-up [address] [facility] [address] [facility] [address]  
  
   
 8311 [facility] 316 [facility] [address] [address] Upcoming Health Maintenance Date Due HEMOGLOBIN A1C Q6M 1955 LIPID PANEL Q1 1955 FOOT EXAM Q1 10/17/1966 MICROALBUMIN Q1 10/17/1966 EYE EXAM RETINAL OR DILATED Q1 10/17/1966 Pneumococcal 19-64 Medium Risk (1 of 1 - PPSV23) 10/17/1975 FOBT Q 1 YEAR AGE 50-75 10/17/2006 ZOSTER VACCINE AGE 60> 2016 Influenza Age 5 to Adult 2017 DTaP/Tdap/Td series (2 - Td) 3/28/2023 Allergies as of 2018  Review Complete On: 2018 By: Laly Galvez Severity Noted Reaction Type Reactions Flexeril [Cyclobenzaprine]  09/10/2015    Other (comments) Librium [Chlordiazepoxide Hcl]  09/10/2015    Hives Current Immunizations  Never Reviewed Name Date Tdap 3/28/2013  5:41 AM  
  
 Not reviewed this visit Vitals BP Pulse Temp Weight(growth percentile) SpO2 BMI  
 188/84 (BP 1 Location: Right arm, BP Patient Position: Sitting) 71 97 °F (36.1 °C) (Tympanic) 254 lb (115.2 kg) 98% 33.51 kg/m2 Smoking Status Current Every Day Smoker BMI and BSA Data Body Mass Index Body Surface Area  
 33.51 kg/m 2 2.44 m 2 Preferred Pharmacy Pharmacy Name Phone [phone] 6 [phone] Your Updated Medication List  
  
   
This list is accurate as of 4/13/18 10:33 AM.  Always use your most recent med list.  
  
  
  
  
 diazePAM 2 mg tablet Commonly known as:  VALIUM Take 1 Tab by mouth every eight (8) hours as needed for Anxiety. Max Daily Amount: 6 mg. FLUoxetine 40 mg capsule Commonly known as:  PROzac Take 1 Cap by mouth daily. gabapentin 300 mg capsule Commonly known as:  NEURONTIN Take 1 Cap by mouth three (3) times daily. ibuprofen 600 mg tablet Commonly known as:  MOTRIN Take 1 Tab by mouth every six (6) hours as needed for Pain.  
  
 ketorolac 10 mg tablet Commonly known as:  TORADOL TAKE 1 TABLET BY MOUTH TWICE A DAY AS NEEDED  
  
 miSOPROStol 200 mcg tablet Commonly known as:  CYTOTEC  
TAKE 1 TABLET BY MOUTH WITH NAPROXEN  
  
 oxyCODONE-acetaminophen 5-325 mg per tablet Commonly known as:  PERCOCET TAKE 1-2 TABLETS BY MOUTH EVERY 4-6 HOURS AS NEEDED FOR PAIN  
  
 QUEtiapine 100 mg tablet Commonly known as:  SEROquel Take 1 Tab by mouth nightly. SPIRIVA RESPIMAT 2.5 mcg/actuation inhaler Generic drug:  tiotropium bromide INHALE 2 PUFFS BY MOUTH EVERY MORNING  
  
 VENTOLIN HFA 90 mcg/actuation inhaler Generic drug:  albuterol TAKE 1-2 PUFFS EVERY 4 TO 6 HOURS AS NEEDED Introducing [facility] SERVICES! Maria Eugenia Moctezuma introduces [employer] patient portal. Now you can access parts of your medical record, email your doctor's office, and request medication refills online. 1. In your internet browser, go to [email or web address] 2. Click on the First Time User? Click Here link in the Sign In box. You will see the New Member Sign Up page. 3. Enter your [employer] Access Code exactly as it appears below. You will not need to use this code after youve completed the sign-up process. If you do not sign up before the expiration date, you must request a new code. · [employer] Access Code: [email or web address] Expires: 5/24/2018 12:23 PM 
 
4. Enter the last four digits of your Social Security Number (xxxx) and Date of Birth (mm/dd/yyyy) as indicated and click Submit. You will be taken to the next sign-up page. 5. Create a [employer] ID. This will be your [employer] login ID and cannot be changed, so think of one that is secure and easy to remember. 6. Create a [employer] password. You can change your password at any time. 7. Enter your Password Reset Question and Answer. This can be used at a later time if you forget your password. 8. Enter your e-mail address. You will receive e-mail notification when new information is available in [address] 9. Click Sign Up. You can now view and download portions of your medical record. 10. Click the Download Summary menu link to download a portable copy of your medical information. If you have questions, please visit the Frequently Asked Questions section of the [employer] website. Remember, [employer] is NOT to be used for urgent needs. For medical emergencies, dial 911. Now available from your iPhone and Android! Please provide this summary of care documentation to your next provider. Your primary care clinician is listed as Suzan Rubio. If you have any questions after today's visit, please call [phone].

## 2018-06-13 DIAGNOSIS — F41.1 GAD (GENERALIZED ANXIETY DISORDER): ICD-10-CM

## 2018-06-13 RX ORDER — FLUOXETINE HYDROCHLORIDE 40 MG/1
40 CAPSULE ORAL DAILY
Qty: 30 CAP | Refills: 0 | Status: SHIPPED | OUTPATIENT
Start: 2018-06-13 | End: 2018-06-20 | Stop reason: SDUPTHER

## 2018-06-13 RX ORDER — DIAZEPAM 2 MG/1
2 TABLET ORAL
Qty: 90 TAB | Refills: 0 | Status: SHIPPED | OUTPATIENT
Start: 2018-06-16 | End: 2018-06-20 | Stop reason: SDUPTHER

## 2018-06-13 RX ORDER — QUETIAPINE FUMARATE 100 MG/1
100 TABLET, FILM COATED ORAL
Qty: 30 TAB | Refills: 0 | Status: SHIPPED | OUTPATIENT
Start: 2018-06-13 | End: 2018-06-20 | Stop reason: SDUPTHER

## 2018-06-13 RX ORDER — GABAPENTIN 300 MG/1
300 CAPSULE ORAL 3 TIMES DAILY
Qty: 90 CAP | Refills: 0 | Status: SHIPPED | OUTPATIENT
Start: 2018-06-13 | End: 2018-06-20 | Stop reason: SDUPTHER

## 2018-06-13 NOTE — TELEPHONE ENCOUNTER
Pt has an appt with you on June 20th. He is out of his medication. Last refill of Valium was 5/16. Last refill of his gabapentin was 5/14.

## 2018-06-14 ENCOUNTER — OFFICE VISIT (OUTPATIENT)
Dept: HEMATOLOGY | Age: 63
End: 2018-06-14

## 2018-06-14 VITALS
BODY MASS INDEX: 32.85 KG/M2 | DIASTOLIC BLOOD PRESSURE: 71 MMHG | HEART RATE: 90 BPM | TEMPERATURE: 98 F | OXYGEN SATURATION: 94 % | SYSTOLIC BLOOD PRESSURE: 124 MMHG | WEIGHT: 249 LBS

## 2018-06-14 DIAGNOSIS — B18.2 CHRONIC HEPATITIS C WITHOUT HEPATIC COMA (HCC): Primary | ICD-10-CM

## 2018-06-14 NOTE — MR AVS SNAPSHOT
[address] [facility] [phone] Miranda Alba 13 
[phone] Patient: Chris Jacobs MRN: [id number] Visit Information Date & Time Provider Department Dept. Phone Encounter #  
 2018 10:15 AM Simone Reyes, [address] [place] [address] 219 [id number] Your Appointments 2018  2:00 PM  
ESTABLISHED PATIENT with Unique Durand MD  
[facility]: 3 month follow-up [address] [facility] [address] [facility] [address] [facility] 178  
  
   
 [address] [facility] [address] [facility] [address] [facility] 7 [address] Upcoming Health Maintenance Date Due HEMOGLOBIN A1C Q6M 1955 LIPID PANEL Q1 1955 FOOT EXAM Q1 10/17/1966 MICROALBUMIN Q1 10/17/1966 EYE EXAM RETINAL OR DILATED Q1 10/17/1966 Pneumococcal 19-64 Medium Risk (1 of 1 - PPSV23) 10/17/1975 FOBT Q 1 YEAR AGE 50-75 10/17/2006 ZOSTER VACCINE AGE 60> 2016 Influenza Age 5 to Adult 2018 DTaP/Tdap/Td series (2 - Td) 3/28/2023 Allergies as of 2018  Review Complete On: 2018 By: Amadeo Mayberry Severity Noted Reaction Type Reactions Flexeril [Cyclobenzaprine]  09/10/2015    Other (comments) Librium [Chlordiazepoxide Hcl]  09/10/2015    Hives Current Immunizations  Never Reviewed Name Date Tdap 3/28/2013  5:41 AM  
  
 Not reviewed this visit Vitals BP Pulse Temp Weight(growth percentile) SpO2 BMI  
 124/71 (BP 1 Location: Left arm, BP Patient Position: Sitting) 90 98 °F (36.7 °C) (Tympanic) 249 lb (112.9 kg) 94% 32.85 kg/m2 Smoking Status Current Every Day Smoker BMI and BSA Data Body Mass Index Body Surface Area  
 32.85 kg/m 2 2.41 m 2 Preferred Pharmacy Pharmacy Name Phone [id number] [place], [address] [facility] 9 [phone] Your Updated Medication List  
  
   
This list is accurate as of 6/14/18 10:26 AM.  Always use your most recent med list.  
  
  
  
  
 diazePAM 2 mg tablet Commonly known as:  VALIUM Take 1 Tab by mouth every eight (8) hours as needed for Anxiety. Max Daily Amount: 6 mg. Start taking on:  6/16/2018 FLUoxetine 40 mg capsule Commonly known as:  PROzac Take 1 Cap by mouth daily. gabapentin 300 mg capsule Commonly known as:  NEURONTIN Take 1 Cap by mouth three (3) times daily. glecaprevir-pibrentasvir 100-40 mg Tab Commonly known as:  Linell Louie Take 3 Tabs by mouth daily. Indications: 1A, viral load undermined, non-cirrhotic.  
  
 ibuprofen 600 mg tablet Commonly known as:  MOTRIN Take 1 Tab by mouth every six (6) hours as needed for Pain.  
  
 ketorolac 10 mg tablet Commonly known as:  TORADOL TAKE 1 TABLET BY MOUTH TWICE A DAY AS NEEDED  
  
 miSOPROStol 200 mcg tablet Commonly known as:  CYTOTEC  
TAKE 1 TABLET BY MOUTH WITH NAPROXEN  
  
 oxyCODONE-acetaminophen 5-325 mg per tablet Commonly known as:  PERCOCET TAKE 1-2 TABLETS BY MOUTH EVERY 4-6 HOURS AS NEEDED FOR PAIN  
  
 QUEtiapine 100 mg tablet Commonly known as:  SEROquel Take 1 Tab by mouth nightly. SPIRIVA RESPIMAT 2.5 mcg/actuation inhaler Generic drug:  tiotropium bromide INHALE 2 PUFFS BY MOUTH EVERY MORNING  
  
 VENTOLIN HFA 90 mcg/actuation inhaler Generic drug:  albuterol TAKE 1-2 PUFFS EVERY 4 TO 6 HOURS AS NEEDED Introducing [facility]! Ian Rahman introduces [employer] patient portal. Now you can access parts of your medical record, email your doctor's office, and request medication refills online. 1. In your internet browser, go to [email or web address] 2. Click on the First Time User? Click Here link in the Sign In box. You will see the New Member Sign Up page. 3. Enter your [employer] Access Code exactly as it appears below.  You will not need to use this code after youve completed the sign-up process. If you do not sign up before the expiration date, you must request a new code. · [employer] Access Code: [email or web address] Expires: 9/12/2018 10:26 AM 
 
4. Enter the last four digits of your Social Security Number (xxxx) and Date of Birth (mm/dd/yyyy) as indicated and click Submit. You will be taken to the next sign-up page. 5. Create a [employer] ID. This will be your [employer] login ID and cannot be changed, so think of one that is secure and easy to remember. 6. Create a [employer] password. You can change your password at any time. 7. Enter your Password Reset Question and Answer. This can be used at a later time if you forget your password. 8. Enter your e-mail address. You will receive e-mail notification when new information is available in [address] 9. Click Sign Up. You can now view and download portions of your medical record. 10. Click the Download Summary menu link to download a portable copy of your medical information. If you have questions, please visit the Frequently Asked Questions section of the [employer] website. Remember, [employer] is NOT to be used for urgent needs. For medical emergencies, dial 911. Now available from your iPhone and Android! Please provide this summary of care documentation to your next provider. Your primary care clinician is listed as Humberto Flowers. If you have any questions after today's visit, please call [phone].

## 2018-06-14 NOTE — PROGRESS NOTES
70 Lenin Shrestha MD, [address], [place], [place]       Jv Romano, HENRIQUE Macedo, DANITAP-KELSIE Camacho NP [address] [facility] 136    at [facility]    217 [facility], [address] [place] [address], [facility] 22.    [phone]    FAX: [address] [place] [address], [address]    [phone]    FAX: [phone]       Patient Care Team:  Quan Durand NP as PCP - General (Nurse Practitioner)  Rose Chaudhari MD (General Surgery)      Problem List  Date Reviewed: 4/13/2018          Codes Class Noted    Chronic hepatitis C without hepatic coma ([facility] 75.) ICD-10-CM: B18.2  ICD-9-CM: 070.54  2/23/2018        Moderate episode of recurrent major depressive disorder ([facility] 75.) ICD-10-CM: F33.1  ICD-9-CM: 296.32  11/15/2017        PTSD (post-traumatic stress disorder) ICD-10-CM: F43.10  ICD-9-CM: 309.81  11/15/2017        LAVERN (generalized anxiety disorder) ICD-10-CM: F41.1  ICD-9-CM: 300.02  11/15/2017        [place] (hard of hearing) ICD-10-CM: H91.90  ICD-9-CM: 389.9  11/15/2017        Diabetes mellitus type 2, controlled ([facility] 75.) ICD-10-CM: E11.9  ICD-9-CM: 250.00  11/15/2017        Cigarette smoker ICD-10-CM: F17.210  ICD-9-CM: 305.1  11/15/2017        Chronic pain disorder ICD-10-CM: G89.4  ICD-9-CM: 338.4  11/15/2017    Overview Signed 11/15/2017 10:25 AM by Breana Ocasio MD     Back and neck             Morbid obesity ([facility] 75.) ICD-10-CM: E66.01  ICD-9-CM: 278.01  9/29/2015        Incisional hernia ICD-10-CM: K43.2  ICD-9-CM: 553.21  9/29/2015            Mahnaz Patterson returns to the [address] for management of chronic HCV.  The active problem list, all pertinent past medical history, medications, liver histology, radiologic findings and laboratory findings related to the liver disorder were reviewed with the patient. The patient is a 65 y.o.  male who was noted to have abnormalities in liver chemistries and subsequently tested positive for chronic HCV remotely. Risk factors for acquiring HCV are blood transfusions. There was no history of acute icteric hepatitis at the time of these risk factors. The most recent imaging of the liver was CT performed in 2015. Results suggest that the liver is normal.      An assessment of liver fibrosis with elastography demonstrated F2-F3. The patient is currently being treated with 8 weeks of [address]. He states they are big pills and make him nauseous. The most recent laboratory studies indicate the liver transaminases are normal, alkaline phosphatase is normal, elevated, tests of hepatic synthetic and metabolic function are normal, total bilirubin is normal and albumin is normal.     The patient has no symptoms which can be attributed to the liver disorder. The patient has not experienced pain in the right side over the liver, yellowing of the eyes or skin, swelling of the abdomen or swelling of the lower extremities. The patient completes all daily activities without any functional limitations. ALLERGIES  Allergies   Allergen Reactions    Flexeril [Cyclobenzaprine] Other (comments)    Librium [Chlordiazepoxide Hcl] Hives     MEDICATIONS  Current Outpatient Prescriptions   Medication Sig    [START ON 6/16/2018] diazePAM (VALIUM) 2 mg tablet Take 1 Tab by mouth every eight (8) hours as needed for Anxiety. Max Daily Amount: 6 mg.    FLUoxetine (PROZAC) 40 mg capsule Take 1 Cap by mouth daily.  QUEtiapine (SEROQUEL) 100 mg tablet Take 1 Tab by mouth nightly.  gabapentin (NEURONTIN) 300 mg capsule Take 1 Cap by mouth three (3) times daily.  glecaprevir-pibrentasvir (MAVYRET) 100-40 mg tab Take 3 Tabs by mouth daily. Indications: 1A, viral load undermined, non-cirrhotic.  albuterol (VENTOLIN HFA) 90 mcg/actuation inhaler TAKE 1-2 PUFFS EVERY 4 TO 6 HOURS AS NEEDED    oxyCODONE-acetaminophen (PERCOCET) 5-325 mg per tablet TAKE 1-2 TABLETS BY MOUTH EVERY 4-6 HOURS AS NEEDED FOR PAIN    SPIRIVA RESPIMAT 2.5 mcg/actuation inhaler INHALE 2 PUFFS BY MOUTH EVERY MORNING    miSOPROStol (CYTOTEC) 200 mcg tablet TAKE 1 TABLET BY MOUTH WITH NAPROXEN    ketorolac (TORADOL) 10 mg tablet TAKE 1 TABLET BY MOUTH TWICE A DAY AS NEEDED    ibuprofen (MOTRIN) 600 mg tablet Take 1 Tab by mouth every six (6) hours as needed for Pain. No current facility-administered medications for this visit. SYSTEM REVIEW NOT RELATED TO LIVER DISEASE OR REVIEWED ABOVE:  Constitution systems: Negative for fever, chills, weight gain, weight loss. Eyes: Negative for visual changes. ENT: Negative for sore throat, painful swallowing. Respiratory: Negative for cough, hemoptysis, SOB. Cardiology: Negative for chest pain, palpitations. GI:  Negative for constipation or diarrhea. : Negative for urinary frequency, dysuria, hematuria, nocturia. Skin: Negative for rash. Hematology: Negative for easy bruising, blood clots. Musculo-skeletal: Negative for back pain, muscle pain, weakness. Neurologic: Negative for headaches, dizziness, vertigo, memory problems not related to HE. Psychology: Positive for anxiety, depression and PTSD. FAMILY HISTORY:  The father  of AMI. The mother  of cancer. There is no family history of liver disease. SOCIAL HISTORY:  The patient is single. The patient has no children. The patient currently smokes 1 pack of tobacco daily. The patient has 2 beers 2-3/week. The patient is currently receiving disability.       PHYSICAL EXAMINATION:  Visit Vitals    /71 (BP 1 Location: Left arm, BP Patient Position: Sitting)    Pulse 90    Temp 98 °F (36.7 °C) (Tympanic)    Wt 249 lb (112.9 kg)    SpO2 94%    BMI 32.85 kg/m2     General: No acute distress. Obese  Eyes: Sclera anicteric. ENT: No oral lesions. Nodes: No adenopathy. Skin: No spider angiomata. No jaundice. No palmar erythema. Respiratory: Lungs clear to auscultation. Cardiovascular: Regular heart rate. 2/6 mid-systolic murmur. No JVD. Abdomen: Soft non-tender. Liver size normal to percussion/palpation. Spleen not palpable. No obvious ascites. Extremities: No edema. No muscle wasting. No gross arthritic changes. Neurologic: Alert and oriented. Cranial nerves grossly intact. No asterixis. LABORATORY STUDIES:  From 2/13/18:   AST//ALT/ALP/T Bili/ALB: 32/28/101/0.5/4.4  BUN/CR: 10/0.87    Liver [place] of [address] [place] [address] [address] Units 2/23/2018   WBC 3.4 - 10.8 x10E3/uL 4.3   ANC 1.4 - 7.0 x10E3/uL 2.2   HGB 13.0 - 17.7 g/dL 13.0    - 379 x10E3/uL 180   INR 0.8 - 1.2 1.0   AST 0 - 40 IU/L 32   ALT 0 - 44 IU/L 27   Alk Phos 39 - 117 IU/L 73   Bili, Total 0.0 - 1.2 mg/dL 0.4   Bili, Direct 0.00 - 0.40 mg/dL 0.15   Albumin 3.6 - 4.8 g/dL 4.3   BUN 8 - 27 mg/dL 14   Creat 0.76 - 1.27 mg/dL 1.04   Na 134 - 144 mmol/L 140   K 3.5 - 5.2 mmol/L 5.6 (H)   Cl 96 - 106 mmol/L 101   CO2 18 - 29 mmol/L 25   Glucose 65 - 99 mg/dL 75     SEROLOGIES:  Serologies Latest Ref Rng & Units 2/23/2018   Hep A Ab, Total Negative Positive (A)   Hep B Surface Ag Negative Negative   Hep B Core Ab, Total Negative Positive (A)   Hep B Surface AB QL  Reactive   Hep C Genotype  1a   HCV RT-PCR, Quant IU/mL See Final Results     LIVER HISTOLOGY:  4/2018. FibroScan performed at [address]. EkPa was 9.5. IQR/med 14%. The results suggested a fibrosis level of F2-F3. CAP was 328, consistent with fatty liver disease. ENDOSCOPIC PROCEDURES:  Not available or performed    RADIOLOGY:  10/2015. CT scan abdomen with IV contrast.  Normal appearing liver. No liver mass lesions. Normal spleen. No ascites.     OTHER TESTING:  Not available or performed    ASSESSMENT AND PLAN:  Liver function is normal.  Total bilirubin is normal.  Serum albumin is normal.  The platelet count is normal.      Based upon laboratory studies and elastography, the patient does not appear to have advanced liver disease or cirrhosis. The patient is currently on 8 weeks of [address]. He was told to continue to take the medication even if the viral load is zero. The patient was directed to continue all current medications at the current dosages. There are no contraindications for the patient to take any medications that are necessary for treatment of other medical issues. The patient was counseled regarding alcohol consumption. Vaccination for viral hepatitis A and B is not needed. The patient has serologic evidence of prior exposure or vaccination with immunity. The patient was counseled regarding diet and exercise to achieve weight loss. The best diet for patients with fatty liver is one very low in carbohydrates and enriched with protein such as an Debbie's program.  This was reviewed and a handout was provided. All of the above issues were discussed with the patient. All questions were answered. The patient expressed a clear understanding of the above. [address] [facility] 87 in 4 -5 weeks at end of treatment.      CAMERON Johnston-BC  Liver [place] [facility] 27[address] [facility] [address] [facility], [address] Adis Moreira  22. [phone]

## 2018-06-14 NOTE — PROGRESS NOTES
1. Have you been to the ER, urgent care clinic since your last visit? Hospitalized since your last visit? No    2. Have you seen or consulted any other health care providers outside of the [address] [place] [address] since your last visit? Include any pap smears or colon screening. No   Chief Complaint   Patient presents with    Follow-up     Visit Vitals    /71 (BP 1 Location: Left arm, BP Patient Position: Sitting)    Pulse 90    Temp 98 °F (36.7 °C) (Tympanic)    Wt 249 lb (112.9 kg)    SpO2 94%    BMI 32.85 kg/m2     PHQ over the last two weeks 4/13/2018   Little interest or pleasure in doing things Not at all   Feeling down, depressed or hopeless Not at all   Total Score PHQ 2 0     Learning Assessment 6/14/2018   PRIMARY LEARNER Patient   BARRIERS PRIMARY LEARNER NONE   CO-LEARNER CAREGIVER No   PRIMARY LANGUAGE ENGLISH   LEARNER PREFERENCE PRIMARY LISTENING   ANSWERED BY patient    RELATIONSHIP SELF     Abuse Screening Questionnaire 6/14/2018   Do you ever feel afraid of your partner? N   Are you in a relationship with someone who physically or mentally threatens you? N   Is it safe for you to go home?  Eleanor Crump

## 2018-06-15 LAB
ALBUMIN SERPL-MCNC: 4.3 G/DL (ref 3.6–4.8)
ALP SERPL-CCNC: 116 IU/L (ref 39–117)
ALT SERPL-CCNC: 10 IU/L (ref 0–44)
AST SERPL-CCNC: 25 IU/L (ref 0–40)
BILIRUB DIRECT SERPL-MCNC: 0.35 MG/DL (ref 0–0.4)
BILIRUB SERPL-MCNC: 0.8 MG/DL (ref 0–1.2)
BUN SERPL-MCNC: 19 MG/DL (ref 8–27)
BUN/CREAT SERPL: 11 (ref 10–24)
CALCIUM SERPL-MCNC: 9.5 MG/DL (ref 8.6–10.2)
CHLORIDE SERPL-SCNC: 99 MMOL/L (ref 96–106)
CO2 SERPL-SCNC: 24 MMOL/L (ref 20–29)
CREAT SERPL-MCNC: 1.71 MG/DL (ref 0.76–1.27)
ERYTHROCYTE [DISTWIDTH] IN BLOOD BY AUTOMATED COUNT: 14.2 % (ref 12.3–15.4)
GFR SERPLBLD CREATININE-BSD FMLA CKD-EPI: 42 ML/MIN/1.73
GFR SERPLBLD CREATININE-BSD FMLA CKD-EPI: 49 ML/MIN/1.73
GLUCOSE SERPL-MCNC: 80 MG/DL (ref 65–99)
HCT VFR BLD AUTO: 42 % (ref 37.5–51)
HGB BLD-MCNC: 13.9 G/DL (ref 13–17.7)
INR PPP: 1 (ref 0.8–1.2)
MCH RBC QN AUTO: 30.2 PG (ref 26.6–33)
MCHC RBC AUTO-ENTMCNC: 33.1 G/DL (ref 31.5–35.7)
MCV RBC AUTO: 91 FL (ref 79–97)
PLATELET # BLD AUTO: 154 X10E3/UL (ref 150–379)
POTASSIUM SERPL-SCNC: 4.4 MMOL/L (ref 3.5–5.2)
PROT SERPL-MCNC: 7.5 G/DL (ref 6–8.5)
PROTHROMBIN TIME: 10.4 SEC (ref 9.1–12)
RBC # BLD AUTO: 4.61 X10E6/UL (ref 4.14–5.8)
SODIUM SERPL-SCNC: 139 MMOL/L (ref 134–144)
WBC # BLD AUTO: 7.9 X10E3/UL (ref 3.4–10.8)

## 2018-06-16 LAB — HCV RNA SERPL QL NAA+PROBE: NEGATIVE

## 2018-06-20 ENCOUNTER — OFFICE VISIT (OUTPATIENT)
Dept: BEHAVIORAL/MENTAL HEALTH CLINIC | Age: 63
End: 2018-06-20

## 2018-06-20 VITALS
WEIGHT: 250 LBS | HEIGHT: 73 IN | HEART RATE: 69 BPM | DIASTOLIC BLOOD PRESSURE: 76 MMHG | BODY MASS INDEX: 33.13 KG/M2 | SYSTOLIC BLOOD PRESSURE: 157 MMHG

## 2018-06-20 DIAGNOSIS — F43.10 PTSD (POST-TRAUMATIC STRESS DISORDER): ICD-10-CM

## 2018-06-20 DIAGNOSIS — F41.1 GAD (GENERALIZED ANXIETY DISORDER): ICD-10-CM

## 2018-06-20 DIAGNOSIS — F33.1 MODERATE EPISODE OF RECURRENT MAJOR DEPRESSIVE DISORDER (HCC): Primary | ICD-10-CM

## 2018-06-20 DIAGNOSIS — F17.210 CIGARETTE SMOKER: ICD-10-CM

## 2018-06-20 PROBLEM — E11.21 TYPE 2 DIABETES WITH NEPHROPATHY (HCC): Status: ACTIVE | Noted: 2018-06-20

## 2018-06-20 RX ORDER — DOXAZOSIN 2 MG/1
TABLET ORAL
Refills: 2 | COMMUNITY
Start: 2018-06-13 | End: 2018-10-01

## 2018-06-20 RX ORDER — DIAZEPAM 2 MG/1
2 TABLET ORAL
Qty: 90 TAB | Refills: 2 | Status: SHIPPED | OUTPATIENT
Start: 2018-07-12 | End: 2018-09-29 | Stop reason: SDUPTHER

## 2018-06-20 RX ORDER — CLONIDINE HYDROCHLORIDE 0.1 MG/1
TABLET ORAL
Refills: 4 | COMMUNITY
Start: 2018-06-13 | End: 2019-01-02

## 2018-06-20 RX ORDER — GABAPENTIN 300 MG/1
300 CAPSULE ORAL 3 TIMES DAILY
Qty: 90 CAP | Refills: 0 | Status: SHIPPED | OUTPATIENT
Start: 2018-07-12

## 2018-06-20 RX ORDER — NAPROXEN 500 MG/1
TABLET ORAL
Refills: 2 | COMMUNITY
Start: 2018-06-13 | End: 2019-01-02

## 2018-06-20 RX ORDER — QUETIAPINE FUMARATE 100 MG/1
100 TABLET, FILM COATED ORAL
Qty: 30 TAB | Refills: 2 | Status: SHIPPED | OUTPATIENT
Start: 2018-06-20 | End: 2018-09-29 | Stop reason: SDUPTHER

## 2018-06-20 RX ORDER — FLUOXETINE HYDROCHLORIDE 40 MG/1
40 CAPSULE ORAL DAILY
Qty: 30 CAP | Refills: 2 | Status: SHIPPED | OUTPATIENT
Start: 2018-06-20 | End: 2018-09-29 | Stop reason: SDUPTHER

## 2018-06-20 NOTE — PROGRESS NOTES
Psychiatric Outpatient Progress Note    Account Number:  [de-identified]  Name: Jessika Lerma    SUBJECTIVE:   CHIEF COMPLAINT:  Miriam Salas a 65 y.o. , Felecia Grewal was seen today for her first follow-up of psychiatric condition and psychotropic medication management.       HPI:    Emiliano reports the following psychiatric symptoms:  depression, anxiety and MCI.  The symptoms have been present for years and are of moderate severity. The symptoms occur daily.  .      Patient  reported that he is having severe fatigue, diziness and tiredness due to a new medicine for Hep C. He will be completing course of this medicine by next week and hopes that it not be repeated. He reported compliance with all his medications. He is [place] and has poor self care. Appears much older than his stated age. Appears tired. Eating well. Weight is stable Reports compliance with medications.  Denies any psychosis or shira.     He has not gained any weight. BMI = 33. Not very active physically due to his painful back and knees. He is mildly SOB at baseline. Continues to smoke 1 ppd despite severe lung disease. BP is high. Reported compliance with his non psych medications.        Contributing factors include: unemployed, lives alone.      Patient denies SI/HI/SIB.        Side Effects:  none        Fam/Soc Hx (from [facility]ial Eval with updates):        REVIEW OF SYSTEMS:  Constitutional: positive for fatigue and weight gain  Eyes: positive for contacts/glasses and visual disturbance  Ears, nose, mouth, throat, and face: positive for hearing loss  Respiratory: positive for SOB  Musculoskeletal:positive for myalgias and arthralgias  Neurological: positive for memory problems and gait problems  Behavioral/Psych: positive for anxiety and depression, negative for SI or HI    Visit Vitals    /76    Pulse 69    Ht 6' 1\" (1.854 m)    Wt 113.4 kg (250 lb)    BMI 32.98 kg/m2       OBJECTIVE:                 Mental Status exam: WNL except for      Sensorium  Alert and Oriented x 4   Relations cooperative and passive    Eye Contact    appropriate   Appearance:  age appropriate and casually dressed   Motor Behavior/Gait:  within normal limits   Speech:  normal pitch and normal volume   Thought Process: goal directed and logical   Thought Content free of delusions and free of hallucinations   Suicidal ideations none   Homicidal ideations none   Mood:  euthymic   Affect:  anxious   Memory recent  adequate   Memory remote:  adequate   Concentration:  adequate   Abstraction:  concrete   Insight:  fair   Reliability fair   Judgment:  fair       MEDICAL DECISION MAKING  Data: pertinent labs, imaging, medical records and diagnostic tests reviewed and incorporated in diagnosis and treatment plan    Allergies   Allergen Reactions    Flexeril [Cyclobenzaprine] Other (comments)    Librium [Chlordiazepoxide Hcl] Hives        Current Outpatient Prescriptions   Medication Sig Dispense Refill    cloNIDine HCl (CATAPRES) 0.1 mg tablet TAKE 1 TABLET BY MOUTH 3 TIMES A DAY FOR ANXIETY  4    doxazosin (CARDURA) 2 mg tablet TAKE 1 TABLET BY MOUTH EVERY DAY AT BEDTIME FOR URINARY INCONTINENCE  2    naproxen (NAPROSYN) 500 mg tablet TAKE ONE TABLET BY MOUTH DAILY AS NEEDED  2    [START ON 7/12/2018] diazePAM (VALIUM) 2 mg tablet Take 1 Tab by mouth every eight (8) hours as needed for Anxiety. Max Daily Amount: 6 mg. 90 Tab 2    FLUoxetine (PROZAC) 40 mg capsule Take 1 Cap by mouth daily. 30 Cap 2    QUEtiapine (SEROQUEL) 100 mg tablet Take 1 Tab by mouth nightly. 30 Tab 2    [START ON 7/12/2018] gabapentin (NEURONTIN) 300 mg capsule Take 1 Cap by mouth three (3) times daily. 90 Cap 0    oxyCODONE-acetaminophen (PERCOCET) 5-325 mg per tablet TAKE 1-2 TABLETS BY MOUTH EVERY 4-6 HOURS AS NEEDED FOR PAIN  0    glecaprevir-pibrentasvir (MAVYRET) 100-40 mg tab Take 3 Tabs by mouth daily.  Indications: 1A, viral load undermined, non-cirrhotic. 84 Tab 1    SPIRIVA RESPIMAT 2.5 mcg/actuation inhaler INHALE 2 PUFFS BY MOUTH EVERY MORNING  11    miSOPROStol (CYTOTEC) 200 mcg tablet TAKE 1 TABLET BY MOUTH WITH NAPROXEN  5    ketorolac (TORADOL) 10 mg tablet TAKE 1 TABLET BY MOUTH TWICE A DAY AS NEEDED  3    albuterol (VENTOLIN HFA) 90 mcg/actuation inhaler TAKE 1-2 PUFFS EVERY 4 TO 6 HOURS AS NEEDED      ibuprofen (MOTRIN) 600 mg tablet Take 1 Tab by mouth every six (6) hours as needed for Pain. 20 Tab 0          Problems addressed today:    ICD-10-CM ICD-9-CM    1. Moderate episode of recurrent major depressive disorder (HCC) F33.1 296.32    2. PTSD (post-traumatic stress disorder) F43.10 309.81    3. LAVERN (generalized anxiety disorder) F41.1 300.02 diazePAM (VALIUM) 2 mg tablet   4. Cigarette smoker F17.210 305.1        Assessment:   Jaison Koenig  is a 65 y.o.  male  is responding to treatment. Symptoms are stable. Patient denies SI/HI/SIB. No evidence of AH/VH or delusions. Risk Scoring- chronic illnesses and prescription drug management    Treatment Plan:  1. Medications:          Medication Changes/Adjustments: Continue current combination of Prozac, gabapentin, Seroquel, and diazepam in the same dosages. Current Outpatient Prescriptions   Medication Sig Dispense Refill    cloNIDine HCl (CATAPRES) 0.1 mg tablet TAKE 1 TABLET BY MOUTH 3 TIMES A DAY FOR ANXIETY  4    doxazosin (CARDURA) 2 mg tablet TAKE 1 TABLET BY MOUTH EVERY DAY AT BEDTIME FOR URINARY INCONTINENCE  2    naproxen (NAPROSYN) 500 mg tablet TAKE ONE TABLET BY MOUTH DAILY AS NEEDED  2    [START ON 7/12/2018] diazePAM (VALIUM) 2 mg tablet Take 1 Tab by mouth every eight (8) hours as needed for Anxiety. Max Daily Amount: 6 mg. 90 Tab 2    FLUoxetine (PROZAC) 40 mg capsule Take 1 Cap by mouth daily. 30 Cap 2    QUEtiapine (SEROQUEL) 100 mg tablet Take 1 Tab by mouth nightly. 30 Tab 2    [START ON 7/12/2018] gabapentin (NEURONTIN) 300 mg capsule Take 1 Cap by mouth three (3) times daily.  80 Cap 0    oxyCODONE-acetaminophen (PERCOCET) 5-325 mg per tablet TAKE 1-2 TABLETS BY MOUTH EVERY 4-6 HOURS AS NEEDED FOR PAIN  0    glecaprevir-pibrentasvir (MAVYRET) 100-40 mg tab Take 3 Tabs by mouth daily. Indications: 1A, viral load undermined, non-cirrhotic. 84 Tab 1    SPIRIVA RESPIMAT 2.5 mcg/actuation inhaler INHALE 2 PUFFS BY MOUTH EVERY MORNING  11    miSOPROStol (CYTOTEC) 200 mcg tablet TAKE 1 TABLET BY MOUTH WITH NAPROXEN  5    ketorolac (TORADOL) 10 mg tablet TAKE 1 TABLET BY MOUTH TWICE A DAY AS NEEDED  3    albuterol (VENTOLIN HFA) 90 mcg/actuation inhaler TAKE 1-2 PUFFS EVERY 4 TO 6 HOURS AS NEEDED      ibuprofen (MOTRIN) 600 mg tablet Take 1 Tab by mouth every six (6) hours as needed for Pain. 20 Tab 0                  The following regarding medications was addressed:    (The risks and benefits of the proposed medication; the potential medication side effects ie    dry mouth, weight gain, GI upset, headache; patient given opportunity to ask questions)       2. Counseling and coordination of care including instructions for treatment, risks/benefits, risk factor reduction and patient/family education. He agrees with the plan. Patient instructed to call with any side effects, questions or issues. 3. Pt was provided supportive therapy for his severe stress of medical problems, living alone  and polypharmacy. PSYCHOTHERAPY:  approx 20 minutes  Type:  Supportive/Cognitive Behavioral/Solution Focused psychotherapy provided  Focus:     Current problems:   Housing issues - living alone   Medical issues    Psychoeducation provided: psych medications    Treatment plan reviewed with patient-including diagnosis and medications    Hector Ann is stable. Follow-up Disposition:  Return in about 3 months (around 9/20/2018).       Brannon Yu MD  6/20/2018

## 2018-08-08 ENCOUNTER — OFFICE VISIT (OUTPATIENT)
Dept: HEMATOLOGY | Age: 63
End: 2018-08-08

## 2018-08-08 VITALS
HEIGHT: 73 IN | DIASTOLIC BLOOD PRESSURE: 55 MMHG | HEART RATE: 57 BPM | TEMPERATURE: 96.7 F | WEIGHT: 258.6 LBS | BODY MASS INDEX: 34.27 KG/M2 | SYSTOLIC BLOOD PRESSURE: 104 MMHG | OXYGEN SATURATION: 96 %

## 2018-08-08 DIAGNOSIS — B18.2 CHRONIC HEPATITIS C WITHOUT HEPATIC COMA (HCC): Primary | ICD-10-CM

## 2018-08-08 NOTE — MR AVS SNAPSHOT
1111 [facility] .[phone] [place] [address] 
[phone] Patient: Scarlett Garcia MRN: [id number] :1955 Visit Information Date & Time Provider Department Dept. Phone Encounter #  
 2018 10:30 AM Layne Colorado, [address] [facility] [id number] Your Appointments 10/1/2018  2:30 PM  
ESTABLISHED PATIENT with Morgan Shipley MD  
[facility]) Appt Note: 3 month follow up 8311 [facility] [address] [place] [address]  
[phone]  
  
   
 1350 [facility] [address] [address]  
  
    
 10/18/2018 10:15 AM  
Follow Up with Layne Colorado, NP [address] ([address] [facility]) Appt Note: Follow up 15[facility] .[phone] [facility], [facility] [address]  
59 [facility] [address] Upcoming Health Maintenance Date Due HEMOGLOBIN A1C Q6M 1955 LIPID PANEL Q1 1955 FOOT EXAM Q1 10/17/1966 MICROALBUMIN Q1 10/17/1966 EYE EXAM RETINAL OR DILATED Q1 10/17/1966 Pneumococcal 19-64 Medium Risk (1 of 1 - PPSV23) 10/17/1975 FOBT Q 1 YEAR AGE 50-75 10/17/2006 ZOSTER VACCINE AGE 60> 2016 Influenza Age 5 to Adult 2018 DTaP/Tdap/Td series (2 - Td) 3/28/2023 Allergies as of 2018  Review Complete On: 2018 By: Cyndi Bell LPN Severity Noted Reaction Type Reactions Flexeril [Cyclobenzaprine]  09/10/2015    Other (comments) Librium [Chlordiazepoxide Hcl]  09/10/2015    Hives Current Immunizations  Never Reviewed Name Date Tdap 3/28/2013  5:41 AM  
  
 Not reviewed this visit You Were Diagnosed With   
  
 Codes Comments Chronic hepatitis C without hepatic coma (HCC)    -  Primary ICD-10-CM: B18.2 ICD-9-CM: 070.54 Vitals BP Pulse Temp Height(growth percentile) 104/55 (BP 1 Location: Left arm, BP Patient Position: Sitting) (!) 57 96.7 °F (35.9 °C) (Tympanic) 6' 1\" (1.854 m) Weight(growth percentile) SpO2 BMI Smoking Status 258 lb 9.6 oz (117.3 kg) 96% 34.12 kg/m2 Current Every Day Smoker Vitals History BMI and BSA Data Body Mass Index Body Surface Area  
 34.12 kg/m 2 2.46 m 2 Preferred Pharmacy Pharmacy Name Phone [id number]- [place], [address] [facility]ly [facility] 8 [phone] Your Updated Medication List  
  
   
This list is accurate as of 8/8/18 11:01 AM.  Always use your most recent med list.  
  
  
  
  
 cloNIDine HCl 0.1 mg tablet Commonly known as:  CATAPRES  
TAKE 1 TABLET BY MOUTH 3 TIMES A DAY FOR ANXIETY  
  
 diazePAM 2 mg tablet Commonly known as:  VALIUM Take 1 Tab by mouth every eight (8) hours as needed for Anxiety. Max Daily Amount: 6 mg.  
  
 doxazosin 2 mg tablet Commonly known as:  CARDURA TAKE 1 TABLET BY MOUTH EVERY DAY AT BEDTIME FOR URINARY INCONTINENCE FLUoxetine 40 mg capsule Commonly known as:  PROzac Take 1 Cap by mouth daily. gabapentin 300 mg capsule Commonly known as:  NEURONTIN Take 1 Cap by mouth three (3) times daily. ibuprofen 600 mg tablet Commonly known as:  MOTRIN Take 1 Tab by mouth every six (6) hours as needed for Pain.  
  
 ketorolac 10 mg tablet Commonly known as:  TORADOL TAKE 1 TABLET BY MOUTH TWICE A DAY AS NEEDED  
  
 miSOPROStol 200 mcg tablet Commonly known as:  CYTOTEC  
TAKE 1 TABLET BY MOUTH WITH NAPROXEN  
  
 naproxen 500 mg tablet Commonly known as:  NAPROSYN  
TAKE ONE TABLET BY MOUTH DAILY AS NEEDED  
  
 oxyCODONE-acetaminophen 5-325 mg per tablet Commonly known as:  PERCOCET TAKE 1-2 TABLETS BY MOUTH EVERY 4-6 HOURS AS NEEDED FOR PAIN  
  
 QUEtiapine 100 mg tablet Commonly known as:  SEROquel Take 1 Tab by mouth nightly. SPIRIVA RESPIMAT 2.5 mcg/actuation inhaler Generic drug:  tiotropium bromide INHALE 2 PUFFS BY MOUTH EVERY MORNING  
  
 VENTOLIN HFA 90 mcg/actuation inhaler Generic drug:  albuterol TAKE 1-2 PUFFS EVERY 4 TO 6 HOURS AS NEEDED We Performed the Following CBC W/O DIFF [60486 CPT(R)] HCV RNA BY ROSA QL,RFLX TO QT [75613 CPT(R)] HEPATIC FUNCTION PANEL [27157 CPT(R)] METABOLIC PANEL, BASIC [02158 CPT(R)] Introducing [facility]! [employer] introduces [employer] [facility] patient portal. Now you can access parts of your medical record, email your doctor's office, and request medication refills online. 1. In your internet browser, go to [email or web address] 2. Click on the First Time User? Click Here link in the Sign In box. You will see the New Member Sign Up page. 3. Enter your [employer] [facility] Access Code exactly as it appears below. You will not need to use this code after youve completed the sign-up process. If you do not sign up before the expiration date, you must request a new code. · [employer] [facility] Access Code: [email or web address] Expires: 9/12/2018 10:26 AM 
 
4. Enter the last four digits of your Social Security Number (xxxx) and Date of Birth (mm/dd/yyyy) as indicated and click Submit. You will be taken to the next sign-up page. 5. Create a [facility] ID. This will be your [facility] login ID and cannot be changed, so think of one that is secure and easy to remember. 6. Create a [facility] password. You can change your password at any time. 7. Enter your Password Reset Question and Answer. This can be used at a later time if you forget your password. 8. Enter your e-mail address. You will receive e-mail notification when new information is available in [address] 9. Click Sign Up. You can now view and download portions of your medical record. 10. Click the Download Summary menu link to download a portable copy of your medical information. If you have questions, please visit the Frequently Asked Questions section of the [employer] website. Remember, [employer] is NOT to be used for urgent needs. For medical emergencies, dial 911. Now available from your iPhone and Android! Please provide this summary of care documentation to your next provider. Your primary care clinician is listed as Nicole Callejas. If you have any questions after today's visit, please call [phone].

## 2018-08-08 NOTE — PROGRESS NOTES
70 Lenin Shrestha MD, [address], [place], [place]       Kaylynn Shaw, KELSIE Ta, HENRIQUE Lawrence, [facility]   KELSIE Caicedo NP [address] [facility] [address]    at [address], 81 [facility], [facility] 22.    [phone]    FAX: [address] [place] [address], [address], [address]    [phone]    FAX: [phone]     Patient Care Team:  Max Joy NP as PCP - General (Nurse Practitioner)  Bhavin Baldwin MD (General Surgery)    Problem List  Date Reviewed: 6/14/2018          Codes Class Noted    Type 2 diabetes with nephropathy ([facility] 75.) ICD-10-CM: E11.21  ICD-9-CM: 250.40, 583.81  6/20/2018        Chronic hepatitis C without hepatic coma ([facility] 75.) ICD-10-CM: B18.2  ICD-9-CM: 070.54  2/23/2018        Moderate episode of recurrent major depressive disorder ([facility] ICD-10-CM: F33.1  ICD-9-CM: 296.32  11/15/2017        PTSD (post-traumatic stress disorder) ICD-10-CM: F43.10  ICD-9-CM: 309.81  11/15/2017        LAVERN (generalized anxiety disorder) ICD-10-CM: F41.1  ICD-9-CM: 300.02  11/15/2017        Mcgrath (hard of hearing) ICD-10-CM: H91.90  ICD-9-CM: 389.9  11/15/2017        Diabetes mellitus type 2, controlled ([facility].) ICD-10-CM: E11.9  ICD-9-CM: 250.00  11/15/2017        Cigarette smoker ICD-10-CM: F17.210  ICD-9-CM: 305.1  11/15/2017        Chronic pain disorder ICD-10-CM: G89.4  ICD-9-CM: 338.4  11/15/2017    Overview Signed 11/15/2017 10:25 AM by Natasha Casey MD     Back and neck             Morbid obesity ([facility].) ICD-10-CM: E66.01  ICD-9-CM: 278.01  9/29/2015        Incisional hernia ICD-10-CM: K43.2  ICD-9-CM: 553.21  9/29/2015            Micki Leon returns to the [address] for management of chronic HCV. The active problem list, all pertinent past medical history, medications, liver histology, radiologic findings and laboratory findings related to the liver disorder were reviewed with the patient. The patient is a 65 y.o.  male who was noted to have abnormalities in liver chemistries and subsequently tested positive for chronic HCV remotely. Risk factors for acquiring HCV are blood transfusions. There was no history of acute icteric hepatitis at the time of these risk factors. The most recent imaging of the liver was CT performed in 2015. Results suggest that the liver is normal.      An assessment of liver fibrosis with elastography demonstrated F2-F3. The patient has completed 8 weeks of [address]. Spoke again about how big the pills were and how they made him feel terrible. The most recent laboratory studies indicate the liver transaminases are normal, alkaline phosphatase is normal, elevated, tests of hepatic synthetic and metabolic function are normal, total bilirubin is normal and albumin is normal.     The patient has no symptoms which can be attributed to the liver disorder. The patient has not experienced pain in the right side over the liver, yellowing of the eyes or skin, swelling of the abdomen or swelling of the lower extremities. The patient completes all daily activities without any functional limitations.     ALLERGIES  Allergies   Allergen Reactions    Flexeril [Cyclobenzaprine] Other (comments)    Librium [Chlordiazepoxide Hcl] Hives     MEDICATIONS  Current Outpatient Prescriptions   Medication Sig    cloNIDine HCl (CATAPRES) 0.1 mg tablet TAKE 1 TABLET BY MOUTH 3 TIMES A DAY FOR ANXIETY    doxazosin (CARDURA) 2 mg tablet TAKE 1 TABLET BY MOUTH EVERY DAY AT BEDTIME FOR URINARY INCONTINENCE    naproxen (NAPROSYN) 500 mg tablet TAKE ONE TABLET BY MOUTH DAILY AS NEEDED    diazePAM (VALIUM) 2 mg tablet Take 1 Tab by mouth every eight (8) hours as needed for Anxiety. Max Daily Amount: 6 mg.    FLUoxetine (PROZAC) 40 mg capsule Take 1 Cap by mouth daily.  QUEtiapine (SEROQUEL) 100 mg tablet Take 1 Tab by mouth nightly.  gabapentin (NEURONTIN) 300 mg capsule Take 1 Cap by mouth three (3) times daily.  SPIRIVA RESPIMAT 2.5 mcg/actuation inhaler INHALE 2 PUFFS BY MOUTH EVERY MORNING    albuterol (VENTOLIN HFA) 90 mcg/actuation inhaler TAKE 1-2 PUFFS EVERY 4 TO 6 HOURS AS NEEDED    ibuprofen (MOTRIN) 600 mg tablet Take 1 Tab by mouth every six (6) hours as needed for Pain.  oxyCODONE-acetaminophen (PERCOCET) 5-325 mg per tablet TAKE 1-2 TABLETS BY MOUTH EVERY 4-6 HOURS AS NEEDED FOR PAIN    miSOPROStol (CYTOTEC) 200 mcg tablet TAKE 1 TABLET BY MOUTH WITH NAPROXEN    ketorolac (TORADOL) 10 mg tablet TAKE 1 TABLET BY MOUTH TWICE A DAY AS NEEDED     No current facility-administered medications for this visit. SYSTEM REVIEW NOT RELATED TO LIVER DISEASE OR REVIEWED ABOVE:  Constitution systems: Negative for fever, chills, weight gain, weight loss. Eyes: Negative for visual changes. ENT: Negative for sore throat, painful swallowing. Respiratory: Negative for cough, hemoptysis, SOB. Cardiology: Negative for chest pain, palpitations. GI:  Negative for constipation or diarrhea. : Negative for urinary frequency, dysuria, hematuria, nocturia. Skin: Negative for rash. Hematology: Negative for easy bruising, blood clots. Musculo-skeletal: Negative for back pain, muscle pain, weakness. Neurologic: Negative for headaches, dizziness, vertigo, memory problems not related to HE. Psychology: Positive for anxiety, depression and PTSD. FAMILY HISTORY:  The father  of AMI. The mother  of cancer. There is no family history of liver disease. SOCIAL HISTORY:  The patient is single. The patient has no children. The patient currently smokes 1 pack of tobacco daily. The patient has 2 beers 2-3/week.    The patient is currently receiving disability. PHYSICAL EXAMINATION:  Visit Vitals    /55 (BP 1 Location: Left arm, BP Patient Position: Sitting)    Pulse (!) 57    Temp 96.7 °F (35.9 °C) (Tympanic)    Ht 6' 1\" (1.854 m)    Wt 258 lb 9.6 oz (117.3 kg)    SpO2 96%    BMI 34.12 kg/m2     General: No acute distress. Obese  Eyes: Sclera anicteric. ENT: No oral lesions. Nodes: No adenopathy. Skin: No spider angiomata. No jaundice. No palmar erythema. Respiratory: Lungs clear to auscultation. Cardiovascular: Regular heart rate. 2/6 mid-systolic murmur. No JVD. Abdomen: Soft non-tender. Liver size normal to percussion/palpation. Spleen not palpable. No obvious ascites. Extremities: No edema. No muscle wasting. No gross arthritic changes. Neurologic: Alert and oriented. Cranial nerves grossly intact. No asterixis. LABORATORY STUDIES:  From 2/13/18:   AST//ALT/ALP/T Bili/ALB: 32/28/101/0.5/4.4  BUN/CR: 10/0.87    SEROLOGIES:    Serologies Latest Ref Rng & Units 2/23/2018   Hep A Ab, Total Negative Positive (A)   Hep B Surface Ag Negative Negative   Hep B Core Ab, Total Negative Positive (A)   Hep B Surface AB QL  Reactive   Hep C Genotype  1a   HCV RT-PCR, Quant IU/mL See Final Results     Virology Latest Ref Rng & Units 6/14/2018 2/23/2018   HCV RNA (IU) IU/mL  [id number]   HCV RNA, ROSA, QL Negative Negative Positive (A)     LIVER HISTOLOGY:  4/2018. FibroScan performed at [facility]. EkPa was 9.5. IQR/med 14%. The results suggested a fibrosis level of F2-F3. CAP was 328, consistent with fatty liver disease. ENDOSCOPIC PROCEDURES:  Not available or performed    RADIOLOGY:  10/2015. CT scan abdomen with IV contrast. Normal appearing liver. No liver mass lesions. Normal spleen. No ascites.     OTHER TESTING:  Not available or performed    ASSESSMENT AND PLAN:  Liver function is normal.  Total bilirubin is normal.  Serum albumin is normal.  The platelet count is normal. Based upon laboratory studies and elastography, the patient does not appear to have advanced liver disease or cirrhosis. The patient has completed 8 weeks of therapy with Mavyret. The patient was directed to continue all current medications at the current dosages. There are no contraindications for the patient to take any medications that are necessary for treatment of other medical issues. The patient was counseled regarding alcohol consumption. Vaccination for viral hepatitis A and B is not needed. The patient has serologic evidence of prior exposure or vaccination with immunity. The patient was counseled regarding diet and exercise to achieve weight loss. The best diet for patients with fatty liver is one very low in carbohydrates and enriched with protein such as an Debbie's program.      Advised him to quit smoking. All of the above issues were discussed with the patient. All questions were answered. The patient expressed a clear understanding of the above. [facility] 87 in 10 weeks for SVR.     Irene Watkins, [facility]  Liver [place] [address] [address] Adis Moreira  22.  [phone]

## 2018-08-08 NOTE — PROGRESS NOTES
Chief Complaint   Patient presents with    Follow-up     Visit Vitals    /55 (BP 1 Location: Left arm, BP Patient Position: Sitting)    Pulse (!) 57    Temp 96.7 °F (35.9 °C) (Tympanic)    Ht 6' 1\" (1.854 m)    Wt 258 lb 9.6 oz (117.3 kg)    SpO2 96%    BMI 34.12 kg/m2     PHQ over the last two weeks 4/13/2018   Little interest or pleasure in doing things Not at all   Feeling down, depressed, irritable, or hopeless Not at all   Total Score PHQ 2 0     1. Have you been to the ER, urgent care clinic since your last visit? Hospitalized since your last visit? No    2. Have you seen or consulted any other health care providers outside of the [facility] since your last visit? Include any pap smears or colon screening.  No

## 2018-08-09 LAB
ALBUMIN SERPL-MCNC: 4.2 G/DL (ref 3.6–4.8)
ALP SERPL-CCNC: 83 IU/L (ref 39–117)
ALT SERPL-CCNC: 16 IU/L (ref 0–44)
AST SERPL-CCNC: 40 IU/L (ref 0–40)
BILIRUB DIRECT SERPL-MCNC: 0.14 MG/DL (ref 0–0.4)
BILIRUB SERPL-MCNC: 0.3 MG/DL (ref 0–1.2)
BUN SERPL-MCNC: 7 MG/DL (ref 8–27)
BUN/CREAT SERPL: 7 (ref 10–24)
CALCIUM SERPL-MCNC: 9 MG/DL (ref 8.6–10.2)
CHLORIDE SERPL-SCNC: 105 MMOL/L (ref 96–106)
CO2 SERPL-SCNC: 19 MMOL/L (ref 20–29)
CREAT SERPL-MCNC: 0.95 MG/DL (ref 0.76–1.27)
ERYTHROCYTE [DISTWIDTH] IN BLOOD BY AUTOMATED COUNT: 15.4 % (ref 12.3–15.4)
GLUCOSE SERPL-MCNC: 97 MG/DL (ref 65–99)
HCT VFR BLD AUTO: 41.9 % (ref 37.5–51)
HGB BLD-MCNC: 13.3 G/DL (ref 13–17.7)
MCH RBC QN AUTO: 29.3 PG (ref 26.6–33)
MCHC RBC AUTO-ENTMCNC: 31.7 G/DL (ref 31.5–35.7)
MCV RBC AUTO: 92 FL (ref 79–97)
PLATELET # BLD AUTO: 153 X10E3/UL (ref 150–379)
POTASSIUM SERPL-SCNC: 4.8 MMOL/L (ref 3.5–5.2)
PROT SERPL-MCNC: 7.4 G/DL (ref 6–8.5)
RBC # BLD AUTO: 4.54 X10E6/UL (ref 4.14–5.8)
SODIUM SERPL-SCNC: 138 MMOL/L (ref 134–144)
WBC # BLD AUTO: 4.2 X10E3/UL (ref 3.4–10.8)

## 2018-08-10 LAB — HCV RNA SERPL QL NAA+PROBE: NEGATIVE

## 2018-09-18 RX ORDER — GABAPENTIN 300 MG/1
CAPSULE ORAL
Qty: 90 CAP | Refills: 0 | Status: SHIPPED | OUTPATIENT
Start: 2018-09-18 | End: 2018-09-29 | Stop reason: SDUPTHER

## 2018-09-28 ENCOUNTER — TELEPHONE (OUTPATIENT)
Dept: BEHAVIORAL/MENTAL HEALTH CLINIC | Age: 63
End: 2018-09-28

## 2018-09-28 NOTE — TELEPHONE ENCOUNTER
Called and spoke with patient two identifiers was used, Appointment confirmed for October 1,2018 at 2:30pm.

## 2018-09-29 DIAGNOSIS — F41.1 GAD (GENERALIZED ANXIETY DISORDER): ICD-10-CM

## 2018-09-30 RX ORDER — FLUOXETINE HYDROCHLORIDE 40 MG/1
CAPSULE ORAL
Qty: 30 CAP | Refills: 2 | Status: SHIPPED | OUTPATIENT
Start: 2018-09-30 | End: 2018-10-01 | Stop reason: SDUPTHER

## 2018-09-30 RX ORDER — DIAZEPAM 2 MG/1
TABLET ORAL
Qty: 90 TAB | Refills: 2 | Status: SHIPPED | OUTPATIENT
Start: 2018-09-30 | End: 2018-12-27 | Stop reason: SDUPTHER

## 2018-09-30 RX ORDER — GABAPENTIN 300 MG/1
CAPSULE ORAL
Qty: 90 CAP | Refills: 0 | Status: SHIPPED | OUTPATIENT
Start: 2018-09-30 | End: 2018-10-01 | Stop reason: SDUPTHER

## 2018-09-30 RX ORDER — QUETIAPINE FUMARATE 100 MG/1
TABLET, FILM COATED ORAL
Qty: 30 TAB | Refills: 2 | Status: SHIPPED | OUTPATIENT
Start: 2018-09-30 | End: 2018-10-01 | Stop reason: SDUPTHER

## 2018-10-01 ENCOUNTER — OFFICE VISIT (OUTPATIENT)
Dept: BEHAVIORAL/MENTAL HEALTH CLINIC | Age: 63
End: 2018-10-01

## 2018-10-01 ENCOUNTER — TELEPHONE (OUTPATIENT)
Dept: BEHAVIORAL/MENTAL HEALTH CLINIC | Age: 63
End: 2018-10-01

## 2018-10-01 VITALS
BODY MASS INDEX: 34.33 KG/M2 | WEIGHT: 259 LBS | HEIGHT: 73 IN | TEMPERATURE: 98.6 F | DIASTOLIC BLOOD PRESSURE: 61 MMHG | OXYGEN SATURATION: 94 % | HEART RATE: 86 BPM | SYSTOLIC BLOOD PRESSURE: 124 MMHG | RESPIRATION RATE: 18 BRPM

## 2018-10-01 DIAGNOSIS — F43.10 PTSD (POST-TRAUMATIC STRESS DISORDER): ICD-10-CM

## 2018-10-01 DIAGNOSIS — F33.1 MODERATE EPISODE OF RECURRENT MAJOR DEPRESSIVE DISORDER (HCC): Primary | ICD-10-CM

## 2018-10-01 DIAGNOSIS — F17.210 CIGARETTE SMOKER: ICD-10-CM

## 2018-10-01 DIAGNOSIS — F41.1 GAD (GENERALIZED ANXIETY DISORDER): ICD-10-CM

## 2018-10-01 RX ORDER — AMLODIPINE BESYLATE 5 MG/1
TABLET ORAL
Refills: 3 | COMMUNITY
Start: 2018-08-07 | End: 2018-10-01

## 2018-10-01 RX ORDER — GABAPENTIN 300 MG/1
CAPSULE ORAL
Qty: 90 CAP | Refills: 1 | Status: SHIPPED | OUTPATIENT
Start: 2018-10-01 | End: 2019-01-02 | Stop reason: SDUPTHER

## 2018-10-01 RX ORDER — GLYCOPYRROLATE AND FORMOTEROL FUMARATE 9; 4.8 UG/1; UG/1
AEROSOL, METERED RESPIRATORY (INHALATION) DAILY
Refills: 11 | COMMUNITY
Start: 2018-09-05

## 2018-10-01 RX ORDER — QUETIAPINE FUMARATE 100 MG/1
TABLET, FILM COATED ORAL
Qty: 30 TAB | Refills: 3 | Status: SHIPPED | OUTPATIENT
Start: 2018-10-01

## 2018-10-01 RX ORDER — DOXAZOSIN 8 MG/1
TABLET ORAL DAILY
Refills: 3 | COMMUNITY
Start: 2018-09-05

## 2018-10-01 RX ORDER — FLUOXETINE HYDROCHLORIDE 40 MG/1
CAPSULE ORAL
Qty: 30 CAP | Refills: 3 | Status: SHIPPED | OUTPATIENT
Start: 2018-10-01

## 2018-10-01 NOTE — PROGRESS NOTES
Psychiatric Outpatient Progress Note    Account Number:  [de-identified]  Name: Lynn Avitia    SUBJECTIVE:   CHIEF COMPLAINT:  Chris Valdes a 65 y.o. , Nohemi Wilson was seen today for 3 month first follow-up of psychiatric condition and psychotropic medication management.       HPI:    Emiliano reports the following psychiatric symptoms:  depression, anxiety and MCI.  The symptoms have been present for years and are of moderate severity. The symptoms occur daily.  .      Patient  reported that he is doing [address] He continue to ask for higher dose of diazepam. He reported that he is not on any narcotic pain medications. He reported compliance with all his medications.  He is [place] and has marginal self care. Appears much older than his stated age. Eating /sleeping well. Reports compliance with medications.  Denies any psychosis or shira.      He has gained 9 lbs. Steve Pinto BMI = 33. Not very active physically due to his painful back and knees. He is mildly SOB at baseline. Continues to smoke 1 ppd despite severe lung disease. Not ready to quit. BP/HR is WNL. Reported compliance with his non psych medications.        Contributing factors include: unemployed, lives alone.       Patient denies SI/HI/SIB.        Side Effects:  none        Fam/Soc Hx (from [facility] Eval with updates):        REVIEW OF SYSTEMS:  Constitutional: positive for fatigue and weight gain  Eyes: positive for contacts/glasses and visual disturbance  Ears, nose, mouth, throat, and face: positive for hearing loss  Respiratory: positive for SOB  Musculoskeletal:positive for myalgias and arthralgias  Neurological: positive for memory problems and gait problems  Behavioral/Psych: positive for anxiety and depression, negative for SI or HI    Visit Vitals    /61 (BP 1 Location: Left arm, BP Patient Position: Sitting)    Pulse 86    Temp 98.6 °F (37 °C)    Resp 18    Ht 6' 1\" (1.854 m)    Wt 117.5 kg (259 lb)    SpO2 94%    BMI 34.17 kg/m2 OBJECTIVE:                 Mental Status exam: WNL except for      Sensorium  oriented to time, place and person   Relations cooperative and passive    Eye Contact    appropriate   Appearance:  age appropriate, bearded, casually dressed and poor hygiene   Motor Behavior/Gait:  within normal limits   Speech:  normal pitch and normal volume   Thought Process: circumstantial and tangential   Thought Content free of delusions and free of hallucinations   Suicidal ideations none   Homicidal ideations none   Mood:  euthymic   Affect:  anxious and irritable   Memory recent  adequate   Memory remote:  adequate   Concentration:  adequate   Abstraction:  concrete   Insight:  fair   Reliability poor   Judgment:  limited       MEDICAL DECISION MAKING  Data: pertinent labs, imaging, medical records and diagnostic tests reviewed and incorporated in diagnosis and treatment plan    Allergies   Allergen Reactions    Flexeril [Cyclobenzaprine] Other (comments)    Librium [Chlordiazepoxide Hcl] Hives        Current Outpatient Prescriptions   Medication Sig Dispense Refill    BEVESPI AEROSPHERE 9-4.8 mcg HFAA daily. [address] [id number], PF, syrg injection TO BE ADMINISTERED BY PHARMACIST FOR IMMUNIZATION  0    doxazosin (CARDURA) 8 mg tablet daily. 3    gabapentin (NEURONTIN) 300 mg capsule TAKE 1 CAPSULE BY MOUTH THREE TIMES A DAY 90 Cap 1    FLUoxetine (PROZAC) 40 mg capsule TAKE 1 CAPSULE BY MOUTH EVERY DAY 30 Cap 3    QUEtiapine (SEROQUEL) 100 mg tablet TAKE 1 TABLET BY MOUTH EVERY DAY AT NIGHT 30 Tab 3    diazePAM (VALIUM) 2 mg tablet TAKE 1 TABLET BY MOUTH EVERY 8 HOURS AS NEEDED FOR ANXIETY 90 Tab 2    cloNIDine HCl (CATAPRES) 0.1 mg tablet TAKE 1 TABLET BY MOUTH 3 TIMES A DAY FOR ANXIETY  4    naproxen (NAPROSYN) 500 mg tablet TAKE ONE TABLET BY MOUTH DAILY AS NEEDED  2    gabapentin (NEURONTIN) 300 mg capsule Take 1 Cap by mouth three (3) times daily.  90 Cap 0    SPIRIVA RESPIMAT 2.5 mcg/actuation inhaler INHALE 2 PUFFS BY MOUTH EVERY MORNING  11    miSOPROStol (CYTOTEC) 200 mcg tablet TAKE 1 TABLET BY MOUTH WITH NAPROXEN  5    albuterol (VENTOLIN HFA) 90 mcg/actuation inhaler TAKE 1-2 PUFFS EVERY 4 TO 6 HOURS AS NEEDED      ibuprofen (MOTRIN) 600 mg tablet Take 1 Tab by mouth every six (6) hours as needed for Pain. 20 Tab 0          Problems addressed today:    ICD-10-CM ICD-9-CM    1. Moderate episode of recurrent major depressive disorder (HCC) F33.1 296.32    2. LAVERN (generalized anxiety disorder) F41.1 300.02    3. PTSD (post-traumatic stress disorder) F43.10 309.81    4. Cigarette smoker F17.210 305.1        Assessment:   Uri Dominique  is a 65 y.o.  male  is responding to treatment. Symptoms are stable. Patient denies SI/HI/SIB. No evidence of AH/VH or delusions. Risk Scoring- chronic illnesses and prescription drug management    Treatment Plan:  1. Medications:          Medication Changes/Adjustments: continue combination of Prozac, Seroquel,. Gabapentin and diazepam in the current dosages. Current Outpatient Prescriptions   Medication Sig Dispense Refill    BEVESPI AEROSPHERE 9-4.8 mcg HFAA daily. [address] [phone], PF, syrg injection TO BE ADMINISTERED BY PHARMACIST FOR IMMUNIZATION  0    doxazosin (CARDURA) 8 mg tablet daily. 3    gabapentin (NEURONTIN) 300 mg capsule TAKE 1 CAPSULE BY MOUTH THREE TIMES A DAY 90 Cap 1    FLUoxetine (PROZAC) 40 mg capsule TAKE 1 CAPSULE BY MOUTH EVERY DAY 30 Cap 3    QUEtiapine (SEROQUEL) 100 mg tablet TAKE 1 TABLET BY MOUTH EVERY DAY AT NIGHT 30 Tab 3    diazePAM (VALIUM) 2 mg tablet TAKE 1 TABLET BY MOUTH EVERY 8 HOURS AS NEEDED FOR ANXIETY 90 Tab 2    cloNIDine HCl (CATAPRES) 0.1 mg tablet TAKE 1 TABLET BY MOUTH 3 TIMES A DAY FOR ANXIETY  4    naproxen (NAPROSYN) 500 mg tablet TAKE ONE TABLET BY MOUTH DAILY AS NEEDED  2    gabapentin (NEURONTIN) 300 mg capsule Take 1 Cap by mouth three (3) times daily.  [address] RESPIMAT 2.5 mcg/actuation inhaler INHALE 2 PUFFS BY MOUTH EVERY MORNING  11    miSOPROStol (CYTOTEC) 200 mcg tablet TAKE 1 TABLET BY MOUTH WITH NAPROXEN  5    albuterol (VENTOLIN HFA) 90 mcg/actuation inhaler TAKE 1-2 PUFFS EVERY 4 TO 6 HOURS AS NEEDED      ibuprofen (MOTRIN) 600 mg tablet Take 1 Tab by mouth every six (6) hours as needed for Pain. 20 Tab 0                  The following regarding medications was addressed:    (The risks and benefits of the proposed medication; the potential medication side effects ie    dry mouth, weight gain, GI upset, headache; patient given opportunity to ask questions)       2. Counseling and coordination of care including instructions for treatment, risks/benefits, risk factor reduction and patient/family education. He agrees with the plan. Patient instructed to call with any side effects, questions or issues. 3.  Pt was provided supportive counseling for his stress of  poor social support, multiple medical problems with chronic pain and depression. He was educated on complications of weight gain and inactive life style. 4.  Pt was again educated on the need to stop smoking. PSYCHOTHERAPY:  approx 20 minutes  Type:  Supportive/Solution Focused psychotherapy provided  Focus:     Current problems:              Inactive life style   Housing issues   Smoking   Medical issues    Psychoeducation provided: psych medications    Treatment plan reviewed with patient-including diagnosis and medications    Worked on issues of denial & effects of nicotine dependency/use    Tanesha Miller is stable. Follow-up Disposition:  Return in about 3 months (around 1/1/2019).       Marily Lundberg MD  10/1/2018

## 2018-10-01 NOTE — TELEPHONE ENCOUNTER
Called in prescription for diazepam 2 mg to nela at [address] [place] [address] on [facility].

## 2018-10-01 NOTE — PROGRESS NOTES
Chief Complaint   Patient presents with    Medication Management     1. Have you been to the ER, urgent care clinic since your last visit? Hospitalized since your last visit? no    2. Have you seen or consulted any other health care providers outside of the [address] [place] [address] since your last visit? Include any pap smears or colon screening.   no    Visit Vitals    /61 (BP 1 Location: Left arm, BP Patient Position: Sitting)    Pulse 86    Temp 98.6 °F (37 °C)    Resp 18    Ht 6' 1\" (1.854 m)    Wt 117.5 kg (259 lb)    SpO2 94%    BMI 34.17 kg/m2

## 2018-11-13 ENCOUNTER — TELEPHONE (OUTPATIENT)
Dept: HEMATOLOGY | Age: 63
End: 2018-11-13

## 2018-11-13 NOTE — TELEPHONE ENCOUNTER
I called the patient to confirm his appt with Donal Ulloa NP on 11/15 at 11:30 AM. Patient didn't answer. I left a VM for patient to call me back.     Robby Salmon

## 2018-12-27 DIAGNOSIS — F41.1 GAD (GENERALIZED ANXIETY DISORDER): ICD-10-CM

## 2018-12-27 RX ORDER — DIAZEPAM 2 MG/1
TABLET ORAL
Qty: 90 TAB | Refills: 2 | Status: SHIPPED | OUTPATIENT
Start: 2018-12-27

## 2018-12-28 RX ORDER — DIAZEPAM 2 MG/1
TABLET ORAL
Qty: 90 TAB | Refills: 2 | OUTPATIENT
Start: 2018-12-28

## 2019-01-02 ENCOUNTER — OFFICE VISIT (OUTPATIENT)
Dept: BEHAVIORAL/MENTAL HEALTH CLINIC | Age: 64
End: 2019-01-02

## 2019-01-02 VITALS
BODY MASS INDEX: 31.41 KG/M2 | HEIGHT: 73 IN | WEIGHT: 237 LBS | DIASTOLIC BLOOD PRESSURE: 57 MMHG | SYSTOLIC BLOOD PRESSURE: 115 MMHG | HEART RATE: 82 BPM

## 2019-01-02 DIAGNOSIS — E66.01 MORBID OBESITY (HCC): ICD-10-CM

## 2019-01-02 DIAGNOSIS — F41.1 GAD (GENERALIZED ANXIETY DISORDER): ICD-10-CM

## 2019-01-02 DIAGNOSIS — F33.1 MODERATE EPISODE OF RECURRENT MAJOR DEPRESSIVE DISORDER (HCC): Primary | ICD-10-CM

## 2019-01-02 DIAGNOSIS — F17.210 CIGARETTE SMOKER: ICD-10-CM

## 2019-01-02 DIAGNOSIS — F43.10 PTSD (POST-TRAUMATIC STRESS DISORDER): ICD-10-CM

## 2019-01-02 RX ORDER — AMOXICILLIN AND CLAVULANATE POTASSIUM 500; 125 MG/1; MG/1
TABLET, FILM COATED ORAL
Refills: 0 | COMMUNITY
Start: 2018-11-16

## 2019-01-02 RX ORDER — TRAMADOL HYDROCHLORIDE 50 MG/1
TABLET ORAL
Refills: 0 | COMMUNITY
Start: 2018-11-16

## 2019-01-02 RX ORDER — PANTOPRAZOLE SODIUM 40 MG/1
TABLET, DELAYED RELEASE ORAL
Refills: 0 | COMMUNITY
Start: 2018-11-16

## 2019-01-02 RX ORDER — CEPHALEXIN 500 MG/1
CAPSULE ORAL
Refills: 0 | COMMUNITY
Start: 2018-11-23 | End: 2019-01-02

## 2019-01-02 RX ORDER — OXYCODONE AND ACETAMINOPHEN 7.5; 325 MG/1; MG/1
TABLET ORAL
Refills: 0 | COMMUNITY
Start: 2018-11-23 | End: 2019-01-02

## 2019-01-02 RX ORDER — AMLODIPINE BESYLATE 5 MG/1
TABLET ORAL
Refills: 0 | COMMUNITY
Start: 2018-11-25

## 2019-01-02 NOTE — PROGRESS NOTES
Psychiatric Outpatient Progress Note    Account Number:  [de-identified]  Name: Lauren Colorado    SUBJECTIVE:   CHIEF COMPLAINT:  Loyda Plane a 59 y.o. , Attila Rivera was seen today for 3 month first follow-up of psychiatric condition and psychotropic medication management.       HPI:    Emiliano reports the following psychiatric symptoms:  depression, anxiety and MCI.  The symptoms have been present for years and are of moderate severity. The symptoms occur daily.  .      Patient  reported that he is doing alright after being hospitalized for a week for left leg phlebitis. He has d/c few days ago and has completed his course of antibiotics. He is not sure how he got this condition. Appears tired and worn out with mild SOB. He has lost lot of weight and not sure how ? He only eats once a day at supper time. Sleeping Ok. He reported compliance with all his medications.  He is [place] and has marginal self care. Appears much older than his stated age. Reports compliance with medications.  Denies any psychosis or shira.      He has lost 22  lbs. Alison Jr BMI = 31. . Not very active physically due to his painful back and knees. He is mildly SOB at baseline. Continues to smoke 1 ppd despite severe lung disease. Not ready to quit. BP/HR is WNL. Reported compliance with his non psych medications.        Contributing factors include: unemployed, lives alone.       Patient denies SI/HI/SIB.        Side Effects:  none        Fam/Soc Hx (from [facility] with updates):        REVIEW OF SYSTEMS:  Constitutional: positive for increased fatigue and weight loss  Eyes: positive for contacts/glasses and visual disturbance  Ears, nose, mouth, throat, and face: positive for hearing loss  Respiratory: positive for SOB  Musculoskeletal:positive for myalgias and arthralgias  Neurological: positive for memory problems and gait problems  Behavioral/Psych: positive for anxiety and depression, negative for SI or HI    Visit Vitals  /57 Pulse 82   Ht 6' 1\" (1.854 m)   Wt 107.5 kg (237 lb)   BMI 31.27 kg/m²       OBJECTIVE:                 Mental Status exam: WNL except for      Sensorium  oriented to time, place and person   Relations cooperative and passive    Eye Contact    appropriate   Appearance:  bearded, casually dressed, older than stated age and poor hygiene   Motor Behavior/Gait:  hypoactive and gait unsteady   Speech:  normal pitch and normal volume   Thought Process: circumstantial and tangential   Thought Content free of delusions and free of hallucinations   Suicidal ideations none   Homicidal ideations none   Mood:  depressed and sad   Affect:  anxious and constricted   Memory recent  adequate   Memory remote:  adequate   Concentration:  adequate   Abstraction:  concrete   Insight:  fair   Reliability fair   Judgment:  fair       MEDICAL DECISION MAKING  Data: pertinent labs, imaging, medical records and diagnostic tests reviewed and incorporated in diagnosis and treatment plan    Allergies   Allergen Reactions    Flexeril [Cyclobenzaprine] Other (comments)    Librium [Chlordiazepoxide Hcl] Hives        Current Outpatient Medications   Medication Sig Dispense Refill    pantoprazole (PROTONIX) 40 mg tablet TAKE 1 TABLET BY MOUTH BEFORE BREAKFAST & DINNER**NEEDED DUE TO DUODENAL ULCERS  0    amoxicillin-clavulanate (AUGMENTIN) 500-125 mg per tablet TAKE 1 TABLET BY MOUTH TWICE A DAY  0    amLODIPine (NORVASC) 5 mg tablet TAKE 1 TABLET BY MOUTH EVERY DAY  0    traMADol (ULTRAM) 50 mg tablet TAKE 1 TABLET BY MOUTH 3 TIMES A DAY AS NEEDED  0    diazePAM (VALIUM) 2 mg tablet TAKE 1 TABLET BY MOUTH EVERY 8 HOURS AS NEEDED FOR ANXIETY 90 Tab 2    BEVESPI AEROSPHERE 9-4.8 mcg HFAA daily. 11    doxazosin (CARDURA) 8 mg tablet daily.   3    FLUoxetine (PROZAC) 40 mg capsule TAKE 1 CAPSULE BY MOUTH EVERY DAY 30 Cap 3    QUEtiapine (SEROQUEL) 100 mg tablet TAKE 1 TABLET BY MOUTH EVERY DAY AT NIGHT 30 Tab 3    gabapentin (NEURONTIN) 300 mg capsule Take 1 Cap by mouth three (3) times daily. 90 Cap 0    SPIRIVA RESPIMAT 2.5 mcg/actuation inhaler INHALE 2 PUFFS BY MOUTH EVERY MORNING  11    albuterol (VENTOLIN HFA) 90 mcg/actuation inhaler TAKE 1-2 PUFFS EVERY 4 TO 6 HOURS AS NEEDED      ibuprofen (MOTRIN) 600 mg tablet Take 1 Tab by mouth every six (6) hours as needed for Pain. 20 Tab 0          Problems addressed today:    ICD-10-CM ICD-9-CM    1. Moderate episode of recurrent major depressive disorder (HCC) F33.1 296.32    2. LAVERN (generalized anxiety disorder) F41.1 300.02    3. PTSD (post-traumatic stress disorder) F43.10 309.81    4. Cigarette smoker F17.210 305.1    5. Morbid obesity ([facility] [address] E66.01 278.01        Assessment:   Bel Oconnor  is a 59 y.o.  male  is responding to treatment. Symptoms are stable. Patient denies SI/HI/SIB. No evidence of AH/VH or delusions. Risk Scoring- chronic illnesses and prescription drug management    Treatment Plan:  1. Medications:          Medication Changes/Adjustments: Continue combination of Prozac, Seroquel, gabapentin and diazepam in the current dosages. Current Outpatient Medications   Medication Sig Dispense Refill    pantoprazole (PROTONIX) 40 mg tablet TAKE 1 TABLET BY MOUTH BEFORE BREAKFAST & DINNER**NEEDED DUE TO DUODENAL ULCERS  0    amoxicillin-clavulanate (AUGMENTIN) 500-125 mg per tablet TAKE 1 TABLET BY MOUTH TWICE A DAY  0    amLODIPine (NORVASC) 5 mg tablet TAKE 1 TABLET BY MOUTH EVERY DAY  0    traMADol (ULTRAM) 50 mg tablet TAKE 1 TABLET BY MOUTH 3 TIMES A DAY AS NEEDED  0    diazePAM (VALIUM) 2 mg tablet TAKE 1 TABLET BY MOUTH EVERY 8 HOURS AS NEEDED FOR ANXIETY 90 Tab 2    BEVESPI AEROSPHERE 9-4.8 mcg HFAA daily. 11    doxazosin (CARDURA) 8 mg tablet daily.   3    FLUoxetine (PROZAC) 40 mg capsule TAKE 1 CAPSULE BY MOUTH EVERY DAY 30 Cap 3    QUEtiapine (SEROQUEL) 100 mg tablet TAKE 1 TABLET BY MOUTH EVERY DAY AT NIGHT 30 Tab 3    gabapentin (NEURONTIN) 300 mg capsule Take 1 Cap by mouth three (3) times daily. 90 Cap 0    SPIRIVA RESPIMAT 2.5 mcg/actuation inhaler INHALE 2 PUFFS BY MOUTH EVERY MORNING  11    albuterol (VENTOLIN HFA) 90 mcg/actuation inhaler TAKE 1-2 PUFFS EVERY 4 TO 6 HOURS AS NEEDED      ibuprofen (MOTRIN) 600 mg tablet Take 1 Tab by mouth every six (6) hours as needed for Pain. 20 Tab 0                  The following regarding medications was addressed:    (The risks and benefits of the proposed medication; the potential medication side effects ie    dry mouth, weight gain, GI upset, headache; patient given opportunity to ask questions)       2. Counseling and coordination of care including instructions for treatment, risks/benefits, risk factor reduction and patient/family education. He agrees with the plan. Patient instructed to call with any side effects, questions or issues. 3.  Pt was provided supportive counseling for his stress of recent hospitalization with phlebitis,   poor social support, multiple medical problems with chronic pain with depression. He was encouraged to walk daily to improve his leg circulation.      4. Pt was again educated on the need to stop smoking with his info that smoking put him at higher risk for blood clots. PSYCHOTHERAPY:  approx 20 minutes  Type:  Supportive/Solution Focused psychotherapy provided  Focus:     Current problems:   Housing issues - lives alone   CMI   Medical issues    Psychoeducation provided: psych medications. Treatment plan reviewed with patient-including diagnosis and medications    Hope Vasquez is not progressing. Follow-up Disposition:  Return in about 3 months (around 4/2/2019).       Elen Woods MD  1/2/2019

## 2022-03-18 PROBLEM — E11.9 DIABETES MELLITUS TYPE 2, CONTROLLED (HCC): Status: ACTIVE | Noted: 2017-11-15

## 2022-03-18 PROBLEM — F43.10 PTSD (POST-TRAUMATIC STRESS DISORDER): Status: ACTIVE | Noted: 2017-11-15

## 2022-03-18 PROBLEM — F41.1 GAD (GENERALIZED ANXIETY DISORDER): Status: ACTIVE | Noted: 2017-11-15

## 2022-03-19 PROBLEM — F17.210 CIGARETTE SMOKER: Status: ACTIVE | Noted: 2017-11-15

## 2022-03-19 PROBLEM — B18.2 CHRONIC HEPATITIS C WITHOUT HEPATIC COMA (HCC): Status: ACTIVE | Noted: 2018-02-23

## 2022-03-19 PROBLEM — H91.90 HOH (HARD OF HEARING): Status: ACTIVE | Noted: 2017-11-15

## 2022-03-19 PROBLEM — F33.1 MODERATE EPISODE OF RECURRENT MAJOR DEPRESSIVE DISORDER (HCC): Status: ACTIVE | Noted: 2017-11-15

## 2022-03-19 PROBLEM — E11.21 TYPE 2 DIABETES WITH NEPHROPATHY (HCC): Status: ACTIVE | Noted: 2018-06-20

## 2022-03-20 PROBLEM — G89.4 CHRONIC PAIN DISORDER: Status: ACTIVE | Noted: 2017-11-15

## 2022-07-04 ENCOUNTER — HOSPITAL ENCOUNTER (EMERGENCY)
Age: 67
Discharge: STILL ADMITTED OR ADMINISTRATIVE | End: 2022-07-04
Attending: EMERGENCY MEDICINE
Payer: MEDICAID

## 2022-07-04 PROCEDURE — 74011250636 HC RX REV CODE- 250/636: Performed by: EMERGENCY MEDICINE

## 2022-07-04 RX ORDER — KETOROLAC TROMETHAMINE 30 MG/ML
15 INJECTION, SOLUTION INTRAMUSCULAR; INTRAVENOUS
Status: DISCONTINUED | OUTPATIENT
Start: 2022-07-04 | End: 2022-07-04 | Stop reason: HOSPADM